# Patient Record
Sex: FEMALE | Race: WHITE | Employment: FULL TIME | URBAN - METROPOLITAN AREA
[De-identification: names, ages, dates, MRNs, and addresses within clinical notes are randomized per-mention and may not be internally consistent; named-entity substitution may affect disease eponyms.]

---

## 2017-03-06 PROBLEM — G47.33 OSA (OBSTRUCTIVE SLEEP APNEA): Status: ACTIVE | Noted: 2017-03-06

## 2017-06-09 PROCEDURE — 84590 ASSAY OF VITAMIN A: CPT | Performed by: FAMILY MEDICINE

## 2017-06-09 PROCEDURE — 82746 ASSAY OF FOLIC ACID SERUM: CPT | Performed by: FAMILY MEDICINE

## 2017-06-09 PROCEDURE — 84597 ASSAY OF VITAMIN K: CPT | Performed by: FAMILY MEDICINE

## 2017-06-09 PROCEDURE — 84207 ASSAY OF VITAMIN B-6: CPT | Performed by: FAMILY MEDICINE

## 2017-06-09 PROCEDURE — 82607 VITAMIN B-12: CPT | Performed by: FAMILY MEDICINE

## 2017-06-13 ENCOUNTER — ANESTHESIA EVENT (OUTPATIENT)
Dept: ENDOSCOPY | Facility: HOSPITAL | Age: 47
End: 2017-06-13

## 2017-06-14 ENCOUNTER — ANESTHESIA (OUTPATIENT)
Dept: ENDOSCOPY | Facility: HOSPITAL | Age: 47
End: 2017-06-14

## 2017-06-14 ENCOUNTER — SURGERY (OUTPATIENT)
Age: 47
End: 2017-06-14

## 2017-06-14 ENCOUNTER — HOSPITAL ENCOUNTER (OUTPATIENT)
Facility: HOSPITAL | Age: 47
Setting detail: HOSPITAL OUTPATIENT SURGERY
Discharge: HOME | End: 2017-06-14
Attending: INTERNAL MEDICINE | Admitting: INTERNAL MEDICINE
Payer: COMMERCIAL

## 2017-06-14 VITALS
BODY MASS INDEX: 47.09 KG/M2 | HEIGHT: 66 IN | DIASTOLIC BLOOD PRESSURE: 80 MMHG | OXYGEN SATURATION: 99 % | RESPIRATION RATE: 16 BRPM | WEIGHT: 293 LBS | HEART RATE: 80 BPM | SYSTOLIC BLOOD PRESSURE: 148 MMHG | TEMPERATURE: 98 F

## 2017-06-14 DIAGNOSIS — K50.80 CROHN'S DISEASE OF BOTH SMALL AND LARGE INTESTINE WITHOUT COMPLICATIONS (HCC): ICD-10-CM

## 2017-06-14 DIAGNOSIS — K21.9 GASTROESOPHAGEAL REFLUX DISEASE WITHOUT ESOPHAGITIS: ICD-10-CM

## 2017-06-14 PROCEDURE — 81025 URINE PREGNANCY TEST: CPT | Performed by: INTERNAL MEDICINE

## 2017-06-14 PROCEDURE — 88305 TISSUE EXAM BY PATHOLOGIST: CPT | Performed by: INTERNAL MEDICINE

## 2017-06-14 PROCEDURE — 0DB68ZX EXCISION OF STOMACH, VIA NATURAL OR ARTIFICIAL OPENING ENDOSCOPIC, DIAGNOSTIC: ICD-10-PCS | Performed by: INTERNAL MEDICINE

## 2017-06-14 PROCEDURE — 0DBE8ZX EXCISION OF LARGE INTESTINE, VIA NATURAL OR ARTIFICIAL OPENING ENDOSCOPIC, DIAGNOSTIC: ICD-10-PCS | Performed by: INTERNAL MEDICINE

## 2017-06-14 RX ORDER — HYDROMORPHONE HYDROCHLORIDE 1 MG/ML
0.4 INJECTION, SOLUTION INTRAMUSCULAR; INTRAVENOUS; SUBCUTANEOUS EVERY 5 MIN PRN
Status: DISCONTINUED | OUTPATIENT
Start: 2017-06-14 | End: 2017-06-14

## 2017-06-14 RX ORDER — DEXAMETHASONE SODIUM PHOSPHATE 4 MG/ML
4 VIAL (ML) INJECTION AS NEEDED
Status: DISCONTINUED | OUTPATIENT
Start: 2017-06-14 | End: 2017-06-14

## 2017-06-14 RX ORDER — SODIUM CHLORIDE, SODIUM LACTATE, POTASSIUM CHLORIDE, CALCIUM CHLORIDE 600; 310; 30; 20 MG/100ML; MG/100ML; MG/100ML; MG/100ML
INJECTION, SOLUTION INTRAVENOUS CONTINUOUS
Status: DISCONTINUED | OUTPATIENT
Start: 2017-06-14 | End: 2017-06-14

## 2017-06-14 RX ORDER — ONDANSETRON 2 MG/ML
4 INJECTION INTRAMUSCULAR; INTRAVENOUS AS NEEDED
Status: DISCONTINUED | OUTPATIENT
Start: 2017-06-14 | End: 2017-06-14

## 2017-06-14 RX ORDER — METOCLOPRAMIDE HYDROCHLORIDE 5 MG/ML
10 INJECTION INTRAMUSCULAR; INTRAVENOUS AS NEEDED
Status: DISCONTINUED | OUTPATIENT
Start: 2017-06-14 | End: 2017-06-14

## 2017-06-14 RX ORDER — HYDROCODONE BITARTRATE AND ACETAMINOPHEN 5; 325 MG/1; MG/1
2 TABLET ORAL AS NEEDED
Status: DISCONTINUED | OUTPATIENT
Start: 2017-06-14 | End: 2017-06-14

## 2017-06-14 RX ORDER — HYDROCODONE BITARTRATE AND ACETAMINOPHEN 5; 325 MG/1; MG/1
1 TABLET ORAL AS NEEDED
Status: DISCONTINUED | OUTPATIENT
Start: 2017-06-14 | End: 2017-06-14

## 2017-06-14 RX ORDER — NALOXONE HYDROCHLORIDE 0.4 MG/ML
80 INJECTION, SOLUTION INTRAMUSCULAR; INTRAVENOUS; SUBCUTANEOUS AS NEEDED
Status: DISCONTINUED | OUTPATIENT
Start: 2017-06-14 | End: 2017-06-14

## 2017-06-14 NOTE — ANESTHESIA PREPROCEDURE EVALUATION
PRE-OP EVALUATION    Patient Name: Naveen Cruz    Pre-op Diagnosis: Gastroesophageal reflux disease without esophagitis [K21.9]  [facility]'s [facility] of both small and large intestine without [facility] [L55.39]    Procedure(s):  COLONOSCOPY/ ES Cardiovascular      ECG reviewed.           (+) obesity  (+) hypertension                                     Endo/Other                           (+) arthritis       Pulmonary                    (+) sleep apnea       Neuro/Psych      (+)

## 2017-06-14 NOTE — H&P
The H&P dated 5/24/17 by Zack Lima MD was reviewed by Zack Lima MD today 6/14/17, the patient was examined and no significant changes have occurred in the patient's condition since the H&P was performed.   I discussed with the patient and/or legal represe

## 2017-06-14 NOTE — OPERATIVE REPORT
[facility]                                                                                           EGD/Colonoscopy Operative Report    Christin Keating Patient Status:  [facility] retroflexed to examine the angulus, GE junction, cardia, body and fundus,  then withdrawn to examine the esophagus. The endoscope was then removed from the patient. The patient tolerated the procedure well with no immediate complications.  The patient was t

## 2017-06-14 NOTE — ANESTHESIA POSTPROCEDURE EVALUATION
[address] Patient Status:  Hospital Outpatient Surgery   Age/Gender 55year old female MRN [id number]   Location [facility]. Attending Gino Christensen MD   Hosp Day # 0 PCP Shirley Vergara MD       Anesthesia Post-op Not

## 2017-06-16 NOTE — PROGRESS NOTES
Quick Note:    6/16/2017  Wilfred Davalos [id number]    Dear Lanie East,       Here are the biopsy/pathology findings from your recent EGD (upper endoscopy) and colonoscopy:     The biopsy/pathology findings from your upper end

## 2017-06-17 PROCEDURE — 84425 ASSAY OF VITAMIN B-1: CPT | Performed by: FAMILY MEDICINE

## 2017-06-22 PROCEDURE — 36415 COLL VENOUS BLD VENIPUNCTURE: CPT | Performed by: FAMILY MEDICINE

## 2017-06-22 PROCEDURE — 84425 ASSAY OF VITAMIN B-1: CPT | Performed by: FAMILY MEDICINE

## 2017-09-28 PROBLEM — R94.39 ABNORMAL NUCLEAR STRESS TEST: Status: ACTIVE | Noted: 2017-09-28

## 2018-01-19 ENCOUNTER — LAB ENCOUNTER (OUTPATIENT)
Dept: LAB | Age: 48
End: 2018-01-19
Attending: SURGERY
Payer: COMMERCIAL

## 2018-01-19 DIAGNOSIS — K90.9 INTESTINAL MALABSORPTION: Primary | ICD-10-CM

## 2018-01-19 PROCEDURE — 84134 ASSAY OF PREALBUMIN: CPT

## 2018-01-19 PROCEDURE — 85025 COMPLETE CBC W/AUTO DIFF WBC: CPT

## 2018-01-20 LAB
BASOPHILS # BLD AUTO: 0 X10(3) UL (ref 0–0.1)
BASOPHILS NFR BLD AUTO: 0 %
EOSINOPHIL # BLD AUTO: 0.12 X10(3) UL (ref 0–0.3)
EOSINOPHIL NFR BLD AUTO: 2.3 %
ERYTHROCYTE [DISTWIDTH] IN BLOOD BY AUTOMATED COUNT: 15.9 % (ref 11.5–16)
HCT VFR BLD AUTO: 43.3 % (ref 34–50)
HGB BLD-MCNC: 13.9 G/DL (ref 12–16)
LYMPHOCYTES # BLD AUTO: 2.37 X10(3) UL (ref 0.9–4)
LYMPHOCYTES NFR BLD AUTO: 45.1 %
MCH RBC QN AUTO: 28.6 PG (ref 27–33.2)
MCHC RBC AUTO-ENTMCNC: 32.1 G/DL (ref 31–37)
MCV RBC AUTO: 89.1 FL (ref 81–100)
MONOCYTES # BLD AUTO: 0.35 X10(3) UL (ref 0.1–0.6)
MONOCYTES NFR BLD AUTO: 6.7 %
NEUTROPHIL ABS PRELIM: 2.39 X10 (3) UL (ref 1.3–6.7)
NEUTROPHILS # BLD AUTO: 2.39 X10(3) UL (ref 1.3–6.7)
NEUTROPHILS NFR BLD AUTO: 45.3 %
PLATELET # BLD AUTO: 395 10(3)UL (ref 150–450)
RBC # BLD AUTO: 4.86 X10(6)UL (ref 3.8–5.1)
RED CELL DISTRIBUTION WIDTH-SD: 52.1 FL (ref 35.1–46.3)
WBC # BLD AUTO: 5.3 X10(3) UL (ref 4–13)

## 2018-01-22 LAB — PREALBUMIN: 17.7 MG/DL (ref 20–40)

## 2018-02-13 ENCOUNTER — LAB ENCOUNTER (OUTPATIENT)
Dept: LAB | Age: 48
End: 2018-02-13
Attending: SURGERY
Payer: COMMERCIAL

## 2018-02-13 DIAGNOSIS — K90.9 INTESTINAL MALABSORPTION, UNSPECIFIED TYPE: Primary | ICD-10-CM

## 2018-02-13 LAB
BASOPHILS # BLD AUTO: 0.02 X10(3) UL (ref 0–0.1)
BASOPHILS NFR BLD AUTO: 0.3 %
EOSINOPHIL # BLD AUTO: 0.09 X10(3) UL (ref 0–0.3)
EOSINOPHIL NFR BLD AUTO: 1.2 %
ERYTHROCYTE [DISTWIDTH] IN BLOOD BY AUTOMATED COUNT: 14.6 % (ref 11.5–16)
FOLATE (FOLIC ACID), SERUM: 20.3 NG/ML (ref 8.7–24)
HAV AB SERPL IA-ACNC: 1273 PG/ML (ref 193–986)
HCT VFR BLD AUTO: 43.8 % (ref 34–50)
HGB BLD-MCNC: 14 G/DL (ref 12–16)
IMMATURE GRANULOCYTE COUNT: 0.02 X10(3) UL (ref 0–1)
IMMATURE GRANULOCYTE RATIO %: 0.3 %
IRON SATURATION: 24 % (ref 13–45)
IRON: 83 UG/DL (ref 28–170)
LYMPHOCYTES # BLD AUTO: 2.95 X10(3) UL (ref 0.9–4)
LYMPHOCYTES NFR BLD AUTO: 38.6 %
MCH RBC QN AUTO: 27.9 PG (ref 27–33.2)
MCHC RBC AUTO-ENTMCNC: 32 G/DL (ref 31–37)
MCV RBC AUTO: 87.3 FL (ref 81–100)
MONOCYTES # BLD AUTO: 0.64 X10(3) UL (ref 0.1–1)
MONOCYTES NFR BLD AUTO: 8.4 %
NEUTROPHIL ABS PRELIM: 3.92 X10 (3) UL (ref 1.3–6.7)
NEUTROPHILS # BLD AUTO: 3.92 X10(3) UL (ref 1.3–6.7)
NEUTROPHILS NFR BLD AUTO: 51.2 %
PLATELET # BLD AUTO: 402 10(3)UL (ref 150–450)
PREALBUMIN: 17.3 MG/DL (ref 20–40)
RBC # BLD AUTO: 5.02 X10(6)UL (ref 3.8–5.1)
RED CELL DISTRIBUTION WIDTH-SD: 46.5 FL (ref 35.1–46.3)
TOTAL IRON BINDING CAPACITY: 341 UG/DL (ref 298–536)
TRANSFERRIN: 229 MG/DL (ref 200–360)
WBC # BLD AUTO: 7.6 X10(3) UL (ref 4–13)

## 2018-02-13 PROCEDURE — 36415 COLL VENOUS BLD VENIPUNCTURE: CPT

## 2018-02-13 PROCEDURE — 85025 COMPLETE CBC W/AUTO DIFF WBC: CPT

## 2018-02-13 PROCEDURE — 82607 VITAMIN B-12: CPT

## 2018-02-13 PROCEDURE — 83550 IRON BINDING TEST: CPT

## 2018-02-13 PROCEDURE — 83540 ASSAY OF IRON: CPT

## 2018-02-13 PROCEDURE — 84134 ASSAY OF PREALBUMIN: CPT

## 2018-02-13 PROCEDURE — 82306 VITAMIN D 25 HYDROXY: CPT

## 2018-02-13 PROCEDURE — 82746 ASSAY OF FOLIC ACID SERUM: CPT

## 2018-02-14 LAB — 25-HYDROXYVITAMIN D (TOTAL): 26.5 NG/ML (ref 30–100)

## 2018-12-11 PROBLEM — M25.511 RIGHT SHOULDER PAIN, UNSPECIFIED CHRONICITY: Status: ACTIVE | Noted: 2018-12-11

## 2018-12-11 PROBLEM — M75.102 ROTATOR CUFF SYNDROME OF BOTH SHOULDERS: Status: ACTIVE | Noted: 2018-12-11

## 2018-12-11 PROBLEM — M19.011 OSTEOARTHRITIS OF BILATERAL GLENOHUMERAL JOINTS: Status: ACTIVE | Noted: 2018-12-11

## 2018-12-11 PROBLEM — M19.012 OSTEOARTHRITIS OF BILATERAL GLENOHUMERAL JOINTS: Status: ACTIVE | Noted: 2018-12-11

## 2018-12-11 PROBLEM — M75.101 ROTATOR CUFF SYNDROME OF BOTH SHOULDERS: Status: ACTIVE | Noted: 2018-12-11

## 2018-12-30 PROCEDURE — 87086 URINE CULTURE/COLONY COUNT: CPT | Performed by: FAMILY MEDICINE

## 2019-01-10 PROBLEM — M75.21 BICEPS TENDINITIS OF RIGHT UPPER EXTREMITY: Status: ACTIVE | Noted: 2019-01-10

## 2019-01-10 PROBLEM — M75.42 IMPINGEMENT SYNDROME OF LEFT SHOULDER: Status: ACTIVE | Noted: 2019-01-10

## 2019-05-15 PROBLEM — Z98.84 S/P GASTRIC BYPASS: Status: ACTIVE | Noted: 2019-05-15

## 2020-09-29 PROBLEM — F10.20 SEVERE ALCOHOL USE DISORDER (HCC): Status: ACTIVE | Noted: 2020-09-29

## 2020-09-29 NOTE — ED INITIAL ASSESSMENT (HPI)
Patient presents requesting assistance with alcohol detox. Last drink Saturday. Patient feels shaky, skin crawling, and is not able to sleep. Denies other substances.  Denies SI.

## 2020-09-29 NOTE — ED PROVIDER NOTES
63-year-old female with a history of alcohol abuse who presented earlier requesting detox. She was medically cleared prior to my arrival.  She has been evaluated by Shirley Fuller and accepted for transfer there for detox bed.   Her  will drive her ove

## 2020-09-29 NOTE — BH LEVEL OF CARE ASSESSMENT
Level of Care Assessment Note    General Questions  Why are you here?: requesting detox from alcohol wanting to get elp for my drinking and anxiety  Precipitating Events: unemployed for 5 year  I am an  fiancial stress because of it.   History of Pr Firearm/Weapon: No  Discussion of Removal of Firearm/Weapon: no access to a gun  Do you have a firearm owner ID card?: No  Collateral for any access to means/firearms/weapons:     Self Injury  History of Self-Injurious Behaviors More than 30 Days Ag History  History of Seclusion/Restraint: No    Alcohol Use  How often do you have a drink containing alcohol? : 4 or more times per week  Alcohol Use  Age at first use?: age 25  Route: Oral  Average amount used? : daily 2 bottles of wine and either 1/5 of by a Partner/Caregiver?: No  Health Concerns r/t Abuse: No  Possible Abuse Reportable to[de-identified] Not appropriate for reporting to authorities    Patient's legal sex: female  Patient identifies as: female  Patient's birth sex: female    General Appearance  David and panic and depression since 2000. has been treated by her PCP and has been on different antidepressants medications. Patient has not worked full time in 5 years. Her current profession is a , Patient  has no putpt providers.  She only saw a therapi

## 2020-09-29 NOTE — ED PROVIDER NOTES
Patient Seen in: [facility] Emergency Department      History   Patient presents with:  Darrin-LILIA    Stated Complaint: darrin mackey. last drink sat    HPI    Patient here for help with alcoholism. She states that she is had a drinking problem for 8 months. [address]   • EXCISION OF ARM MASS Right 1/26/2011    Performed by Anh Mccurdy MD at [facility]   • OTHER      Gastric bypass   • OTHER SURGICAL HISTORY  2006 Dr Asha Lu HISTORY  2/15 Result Value    Creatinine 1.28 (*)     BUN/CREA Ratio 9.4 (*)     GFR, Non- 49 (*)     GFR, -American 57 (*)     Albumin 2.9 (*)     A/G Ratio 0.7 (*)     All other components within normal limits   ETHYL ALCOHOL - Normal   CB medications    LORAZEPAM 1 MG Oral Tab  Take 1 tablet (1 mg total) by mouth 2 (two) times daily as needed for Anxiety.   Qty: 12 tablet Refills: 0

## 2020-10-05 ENCOUNTER — PATIENT OUTREACH (OUTPATIENT)
Dept: CASE MANAGEMENT | Age: 50
End: 2020-10-05

## 2020-10-05 NOTE — PROGRESS NOTES
Name: Jennifer Perez       : 1970   Gender: female   Race: White   Ethnicity: NON  OR  OR  ETHNICITY   Employer Group:   None     Insurance Information:        Medical Group Name: [id number] [id number]   Insurance Type: Bl

## 2020-10-05 NOTE — PROGRESS NOTES
Name: Jayson Lima       : 1970   Assessment obtained via:  In Person        CASE CLOSED DATE/ REASON FOR CLOSURE:      DIAGNOSIS/ TREATMENT:    Mental Health Diagnosis:  : Alcohol Use Disorder Severe, Unspecified Anxiety, MDD Rec Mod   Medic (10 mg total) by mouth 3 (three) times daily. , Disp: 90 tablet, Rfl: 2    •  DULOXETINE HCL 30 MG Oral Cap DR Particles, TAKE ONE CAPSULE BY MOUTH EVERY DAY, Disp: 90 capsule, Rfl: 3    •  DULOXETINE HCL 60 MG Oral Cap DR Particles, TAKE 1 CAPSULE BY MOUTH will follow the program protocal. Pt states she feels great and denies any cravings. We discussed anti craving medications and I told her that they will probably discuss them with her at the PHP/IOP level . We also discussed 12 step meetings.  I encouraged

## 2024-03-08 PROBLEM — M15.1: Status: ACTIVE | Noted: 2024-01-10

## 2024-03-08 RX ORDER — THIAMINE HCL 100 MG
1 TABLET ORAL DAILY
COMMUNITY

## 2024-04-01 ENCOUNTER — LABORATORY ENCOUNTER (OUTPATIENT)
Dept: LAB | Age: 54
End: 2024-04-01
Attending: ORTHOPAEDIC SURGERY
Payer: COMMERCIAL

## 2024-04-01 ENCOUNTER — ANESTHESIA EVENT (OUTPATIENT)
Dept: SURGERY | Facility: HOSPITAL | Age: 54
End: 2024-04-01
Payer: COMMERCIAL

## 2024-04-01 DIAGNOSIS — Z01.818 PRE-OP TESTING: ICD-10-CM

## 2024-04-01 LAB
ANTIBODY SCREEN: NEGATIVE
RH BLOOD TYPE: POSITIVE

## 2024-04-01 PROCEDURE — 86901 BLOOD TYPING SEROLOGIC RH(D): CPT

## 2024-04-01 PROCEDURE — 86900 BLOOD TYPING SEROLOGIC ABO: CPT

## 2024-04-01 PROCEDURE — 86850 RBC ANTIBODY SCREEN: CPT

## 2024-04-08 NOTE — H&P
[facility]  History & Physical    Leena Hernandez Patient Status:  Outpatient in a Bed    1970 MRN [id number]   Location [facility] SURGERY Attending Keo Keen MD   Hosp Day # 0 PCP Regine Posada MD     Date of Admission:  (Not on file)    History of Present Illness:  Leena Hernandez is a(n) 53 year old female.  The patient has failed conservative treatment for left knee osteoarthritis and has significant limitations in ADL's including ambulation and exercise, and presents for total joint arthroplasty at this time.    History:  Past Medical History:   Diagnosis Date    Anxiety state     Arthritis     Asthma (HCC)     Asthma, exercise induced (HCC)     Crohn disease (HCC)     Crohn's disease (HCC)     DEPRESSION     was started by GI    Depression     Essential hypertension     High blood pressure     Hx of motion sickness     IBS (irritable bowel syndrome)     OBESITY     morbid obesity    Obesity     Osteoarthritis     OTHER DISEASES     crohn's disease    Ovarian cyst 2012    No tx done, observation only    Seizure disorder (HCC)     X1 IN MAY 2023 DUE TO ALCOHOL WITHDRAWL, NONE SINCE    SLEEP APNEA DMG SPLIT NIGHT 2--12    AHI 76/ RDI 86/ SaO2 josue 78%/ AutoPAP 6-20    Sleep apnea     NO MACHINE/TREATMENT    Visual impairment     contacts and glasses     Past Surgical History:   Procedure Laterality Date      2007    twins    CHOLECYSTECTOMY  2007    Dr Torre    COLONOSCOPY      done for Crohn's flare    COLONOSCOPY  3/13/14 - LUIS ALFREDO Horton    adenoma, chronic inactive colitis, hemorrhoids, repeat  w/MAC    COLONOSCOPY N/A 2017    Procedure: COLONOSCOPY;  Surgeon: Johny Gibbs MD;  Location:  ENDOSCOPY    COLONOSCOPY,BIOPSY  06/10/2011    LUIS ALFREDO Horton. scarring. hemorrhoids. repeat .     COLONOSCPY, FLEXIBLE, PROXIMAL TO SPLENIC FLEXURE; W/DIRECTED SUBMUCOSA INJECTION(S), ANY SUBSTANCE  06/10/2011    Performed by NATHALIE HORTON  at [facility] [place]    EXC SKIN BENIG 1.1-2CM TRUNK,ARM,LEG  01/26/2011    Performed by LESLY TORRE at [facility]    GASTRIC BYPASS,OBESITY,SB RECONSTRUC      OTHER      Gastric bypass    OTHER SURGICAL HISTORY  2006 Dr Torre    Port A Cath Placement    OTHER SURGICAL HISTORY  2/15/14 ([facility]) Dr.Matt Mack    PORT REMOVAL    UPPER GI ENDOSCOPY,BIOPSY  06/10/2011    LUIS ALFREDO Horton normal EGD     Family History   Problem Relation Age of Onset    Obesity Sister     Other (Other) Sister         hypothyroidism    Heart Disorder Mother         MI at 72    Obesity Mother     Other (Other) Mother     Other (Other) Paternal Grandfather         possible Crohn's    Heart Disorder Sister         arrhythmia    Cancer Father         prostate cx      reports that she has never smoked. She has never used smokeless tobacco. She reports that she does not currently use alcohol. She reports that she does not currently use drugs after having used the following drugs: Cannabis.    Allergies:  Allergies   Allergen Reactions    Ibuprofen NAUSEA AND VOMITING     GI UPSET    Levaquin [Levofloxacin] PAIN    Penicillins UNKNOWN     CHILDHOOD ALLERGY, REACTION UNKNOWN       Home Medications:  No medications prior to admission.       Physical Exam:   General: Alert, orientated x3.  Cooperative.  No apparent distress.  Vital Signs:  Height 67\", weight 260 lb.  HEENT: Exam is unremarkable.    Neck: No tenderness to palpitation. No JVD. Supple.   Lungs: Clear to auscultation bilaterally.  Cardiac: Regular rate and rhythm. No murmur.  Abdomen:  Soft, non-distended, non-tender, with no rebound or guarding.   Extremities:  No lower extremity edema noted.  Without clubbing or cyanosis.    The left knee is tender at the medial and lateral joint lines, with crepitus on range of motion    Laboratory Data:  xrays show severe osteoarthritis of the left knee    Impression and Plan:  Patient Active Problem List   Diagnosis    GERD  (gastroesophageal reflux disease)    OA (osteoarthritis) of knee    Crohn's disease of both small and large intestine without complication (HCC)    Morbid obesity (HCC)    History of adenomatous polyp of colon    Left shoulder pain    Rotator cuff tendinitis, left    KOLBY (generalized anxiety disorder)    Major depressive disorder, recurrent episode, moderate (HCC)    Essential hypertension    Superficial keratitis of both eyes    Floppy eyelid syndrome    SAWYER (obstructive sleep apnea)    Abnormal nuclear stress test    Rotator cuff syndrome of both shoulders    Osteoarthritis of bilateral glenohumeral joints    Right shoulder pain, unspecified chronicity    Biceps tendinitis of right upper extremity    Impingement syndrome of left shoulder    S/P gastric bypass    Severe alcohol use disorder (HCC)    Anxiety    Abdominal pain    Osteoarthritis of distal interphalangeal (DIP) joint of left ring finger       Plan is for cemented left total knee arthroplasty        Keo Keen MD  4/8/2024  12:35 PM

## 2024-04-10 ENCOUNTER — ANESTHESIA (OUTPATIENT)
Dept: SURGERY | Facility: HOSPITAL | Age: 54
End: 2024-04-10
Payer: COMMERCIAL

## 2024-04-10 ENCOUNTER — HOSPITAL ENCOUNTER (OUTPATIENT)
Facility: HOSPITAL | Age: 54
Discharge: HOME HEALTH | End: 2024-04-11
Attending: ORTHOPAEDIC SURGERY | Admitting: ORTHOPAEDIC SURGERY
Payer: COMMERCIAL

## 2024-04-10 DIAGNOSIS — Z01.818 PRE-OP TESTING: Primary | ICD-10-CM

## 2024-04-10 DIAGNOSIS — M17.12 PRIMARY OSTEOARTHRITIS OF LEFT KNEE: ICD-10-CM

## 2024-04-10 PROCEDURE — 0SRD0J9 REPLACEMENT OF LEFT KNEE JOINT WITH SYNTHETIC SUBSTITUTE, CEMENTED, OPEN APPROACH: ICD-10-PCS | Performed by: ORTHOPAEDIC SURGERY

## 2024-04-10 PROCEDURE — 88305 TISSUE EXAM BY PATHOLOGIST: CPT | Performed by: ORTHOPAEDIC SURGERY

## 2024-04-10 PROCEDURE — 97530 THERAPEUTIC ACTIVITIES: CPT

## 2024-04-10 PROCEDURE — 97161 PT EVAL LOW COMPLEX 20 MIN: CPT

## 2024-04-10 PROCEDURE — 88311 DECALCIFY TISSUE: CPT | Performed by: ORTHOPAEDIC SURGERY

## 2024-04-10 PROCEDURE — 76942 ECHO GUIDE FOR BIOPSY: CPT | Performed by: ANESTHESIOLOGY

## 2024-04-10 DEVICE — ATTUNE PATELLA MEDIALIZED DOME 38MM CEMENTED AOX
Type: IMPLANTABLE DEVICE | Site: KNEE | Status: FUNCTIONAL
Brand: ATTUNE

## 2024-04-10 DEVICE — ATTUNE KNEE SYSTEM FEMORAL POSTERIOR STABILIZED SIZE 7 LEFT CEMENTED
Type: IMPLANTABLE DEVICE | Site: KNEE | Status: FUNCTIONAL
Brand: ATTUNE

## 2024-04-10 DEVICE — SMARTSET HV HIGH VISCOSITY BONE CEMENT 40G
Type: IMPLANTABLE DEVICE | Site: KNEE | Status: FUNCTIONAL
Brand: SMARTSET

## 2024-04-10 DEVICE — ATTUNE KNEE SYSTEM TIBIAL BASE ROTATING PLATFORM SIZE 6 CEMENTED
Type: IMPLANTABLE DEVICE | Site: KNEE | Status: FUNCTIONAL
Brand: ATTUNE

## 2024-04-10 DEVICE — ATTUNE KNEE SYSTEM TIBIAL INSERT ROTATING PLATFORM POSTERIOR STABILIZED 7 10MM AOX
Type: IMPLANTABLE DEVICE | Site: KNEE | Status: FUNCTIONAL
Brand: ATTUNE

## 2024-04-10 RX ORDER — DEXAMETHASONE SODIUM PHOSPHATE 10 MG/ML
8 INJECTION, SOLUTION INTRAMUSCULAR; INTRAVENOUS ONCE
Status: COMPLETED | OUTPATIENT
Start: 2024-04-11 | End: 2024-04-11

## 2024-04-10 RX ORDER — TRANEXAMIC ACID 10 MG/ML
INJECTION, SOLUTION INTRAVENOUS AS NEEDED
Status: DISCONTINUED | OUTPATIENT
Start: 2024-04-10 | End: 2024-04-10 | Stop reason: SURG

## 2024-04-10 RX ORDER — SCOLOPAMINE TRANSDERMAL SYSTEM 1 MG/1
1 PATCH, EXTENDED RELEASE TRANSDERMAL ONCE
Status: DISCONTINUED | OUTPATIENT
Start: 2024-04-10 | End: 2024-04-10 | Stop reason: HOSPADM

## 2024-04-10 RX ORDER — MIDAZOLAM HYDROCHLORIDE 1 MG/ML
INJECTION INTRAMUSCULAR; INTRAVENOUS AS NEEDED
Status: DISCONTINUED | OUTPATIENT
Start: 2024-04-10 | End: 2024-04-10 | Stop reason: SURG

## 2024-04-10 RX ORDER — PHENYLEPHRINE HCL 10 MG/ML
VIAL (ML) INJECTION AS NEEDED
Status: DISCONTINUED | OUTPATIENT
Start: 2024-04-10 | End: 2024-04-10 | Stop reason: SURG

## 2024-04-10 RX ORDER — ASPIRIN 81 MG/1
81 TABLET ORAL 2 TIMES DAILY
Qty: 28 TABLET | Refills: 0 | Status: SHIPPED | OUTPATIENT
Start: 2024-04-10 | End: 2024-04-11

## 2024-04-10 RX ORDER — LOSARTAN POTASSIUM 100 MG/1
100 TABLET ORAL DAILY
COMMUNITY

## 2024-04-10 RX ORDER — HYDROCODONE BITARTRATE AND ACETAMINOPHEN 5; 325 MG/1; MG/1
2 TABLET ORAL ONCE AS NEEDED
Status: DISCONTINUED | OUTPATIENT
Start: 2024-04-10 | End: 2024-04-10 | Stop reason: HOSPADM

## 2024-04-10 RX ORDER — FOLIC ACID 1 MG/1
1 TABLET ORAL DAILY
Status: DISCONTINUED | OUTPATIENT
Start: 2024-04-11 | End: 2024-04-11

## 2024-04-10 RX ORDER — ROPIVACAINE HYDROCHLORIDE 5 MG/ML
INJECTION, SOLUTION EPIDURAL; INFILTRATION; PERINEURAL AS NEEDED
Status: DISCONTINUED | OUTPATIENT
Start: 2024-04-10 | End: 2024-04-10 | Stop reason: SURG

## 2024-04-10 RX ORDER — MELATONIN
325
Status: DISCONTINUED | OUTPATIENT
Start: 2024-04-11 | End: 2024-04-11

## 2024-04-10 RX ORDER — TRANEXAMIC ACID 10 MG/ML
INJECTION, SOLUTION INTRAVENOUS
Status: COMPLETED
Start: 2024-04-10 | End: 2024-04-10

## 2024-04-10 RX ORDER — ENEMA 19; 7 G/133ML; G/133ML
1 ENEMA RECTAL ONCE AS NEEDED
Status: DISCONTINUED | OUTPATIENT
Start: 2024-04-10 | End: 2024-04-11

## 2024-04-10 RX ORDER — HYDROMORPHONE HYDROCHLORIDE 1 MG/ML
0.4 INJECTION, SOLUTION INTRAMUSCULAR; INTRAVENOUS; SUBCUTANEOUS EVERY 5 MIN PRN
Status: DISCONTINUED | OUTPATIENT
Start: 2024-04-10 | End: 2024-04-10 | Stop reason: HOSPADM

## 2024-04-10 RX ORDER — ACETAMINOPHEN 325 MG/1
650 TABLET ORAL ONCE
Status: COMPLETED | OUTPATIENT
Start: 2024-04-10 | End: 2024-04-10

## 2024-04-10 RX ORDER — ONDANSETRON 2 MG/ML
INJECTION INTRAMUSCULAR; INTRAVENOUS AS NEEDED
Status: DISCONTINUED | OUTPATIENT
Start: 2024-04-10 | End: 2024-04-10 | Stop reason: SURG

## 2024-04-10 RX ORDER — ASPIRIN 81 MG/1
81 TABLET ORAL 2 TIMES DAILY
Status: DISCONTINUED | OUTPATIENT
Start: 2024-04-10 | End: 2024-04-10

## 2024-04-10 RX ORDER — DEXAMETHASONE SODIUM PHOSPHATE 10 MG/ML
INJECTION, SOLUTION INTRAMUSCULAR; INTRAVENOUS AS NEEDED
Status: DISCONTINUED | OUTPATIENT
Start: 2024-04-10 | End: 2024-04-10 | Stop reason: SURG

## 2024-04-10 RX ORDER — ACETAMINOPHEN 500 MG
1000 TABLET ORAL ONCE
Status: DISCONTINUED | OUTPATIENT
Start: 2024-04-10 | End: 2024-04-10 | Stop reason: HOSPADM

## 2024-04-10 RX ORDER — BUPIVACAINE HYDROCHLORIDE 7.5 MG/ML
INJECTION, SOLUTION INTRASPINAL AS NEEDED
Status: DISCONTINUED | OUTPATIENT
Start: 2024-04-10 | End: 2024-04-10 | Stop reason: SURG

## 2024-04-10 RX ORDER — CEFAZOLIN SODIUM IN 0.9 % NACL 3 G/100 ML
3 INTRAVENOUS SOLUTION, PIGGYBACK (ML) INTRAVENOUS EVERY 8 HOURS
Qty: 200 ML | Refills: 0 | Status: COMPLETED | OUTPATIENT
Start: 2024-04-10 | End: 2024-04-11

## 2024-04-10 RX ORDER — KETOROLAC TROMETHAMINE 30 MG/ML
30 INJECTION, SOLUTION INTRAMUSCULAR; INTRAVENOUS EVERY 6 HOURS
Status: DISCONTINUED | OUTPATIENT
Start: 2024-04-10 | End: 2024-04-11

## 2024-04-10 RX ORDER — OXYCODONE HYDROCHLORIDE 5 MG/1
5 TABLET ORAL EVERY 4 HOURS PRN
Status: DISCONTINUED | OUTPATIENT
Start: 2024-04-10 | End: 2024-04-11

## 2024-04-10 RX ORDER — TRANEXAMIC ACID 10 MG/ML
1000 INJECTION, SOLUTION INTRAVENOUS ONCE
Status: COMPLETED | OUTPATIENT
Start: 2024-04-10 | End: 2024-04-10

## 2024-04-10 RX ORDER — ACETAMINOPHEN 500 MG
1000 TABLET ORAL EVERY 8 HOURS PRN
Status: DISCONTINUED | OUTPATIENT
Start: 2024-04-10 | End: 2024-04-11

## 2024-04-10 RX ORDER — EPHEDRINE SULFATE 50 MG/ML
INJECTION INTRAVENOUS AS NEEDED
Status: DISCONTINUED | OUTPATIENT
Start: 2024-04-10 | End: 2024-04-10 | Stop reason: SURG

## 2024-04-10 RX ORDER — HYDROCODONE BITARTRATE AND ACETAMINOPHEN 5; 325 MG/1; MG/1
1 TABLET ORAL ONCE AS NEEDED
Status: DISCONTINUED | OUTPATIENT
Start: 2024-04-10 | End: 2024-04-10 | Stop reason: HOSPADM

## 2024-04-10 RX ORDER — LABETALOL HYDROCHLORIDE 5 MG/ML
5 INJECTION, SOLUTION INTRAVENOUS EVERY 5 MIN PRN
Status: DISCONTINUED | OUTPATIENT
Start: 2024-04-10 | End: 2024-04-10 | Stop reason: HOSPADM

## 2024-04-10 RX ORDER — BISACODYL 10 MG
10 SUPPOSITORY, RECTAL RECTAL
Status: DISCONTINUED | OUTPATIENT
Start: 2024-04-10 | End: 2024-04-11

## 2024-04-10 RX ORDER — HYDROMORPHONE HYDROCHLORIDE 1 MG/ML
0.4 INJECTION, SOLUTION INTRAMUSCULAR; INTRAVENOUS; SUBCUTANEOUS EVERY 2 HOUR PRN
Status: DISCONTINUED | OUTPATIENT
Start: 2024-04-10 | End: 2024-04-11

## 2024-04-10 RX ORDER — OXYCODONE HYDROCHLORIDE 10 MG/1
10 TABLET ORAL EVERY 4 HOURS PRN
Status: DISCONTINUED | OUTPATIENT
Start: 2024-04-10 | End: 2024-04-11

## 2024-04-10 RX ORDER — DOCUSATE SODIUM 100 MG/1
100 CAPSULE, LIQUID FILLED ORAL 2 TIMES DAILY
Status: DISCONTINUED | OUTPATIENT
Start: 2024-04-10 | End: 2024-04-11

## 2024-04-10 RX ORDER — ONDANSETRON 2 MG/ML
4 INJECTION INTRAMUSCULAR; INTRAVENOUS EVERY 6 HOURS PRN
Status: DISCONTINUED | OUTPATIENT
Start: 2024-04-10 | End: 2024-04-11

## 2024-04-10 RX ORDER — DIPHENHYDRAMINE HYDROCHLORIDE 50 MG/ML
25 INJECTION INTRAMUSCULAR; INTRAVENOUS ONCE AS NEEDED
Status: ACTIVE | OUTPATIENT
Start: 2024-04-10 | End: 2024-04-10

## 2024-04-10 RX ORDER — ACETAMINOPHEN 500 MG
1000 TABLET ORAL ONCE AS NEEDED
Status: DISCONTINUED | OUTPATIENT
Start: 2024-04-10 | End: 2024-04-10 | Stop reason: HOSPADM

## 2024-04-10 RX ORDER — HYDROCODONE BITARTRATE AND ACETAMINOPHEN 10; 325 MG/1; MG/1
1-2 TABLET ORAL EVERY 4 HOURS PRN
Qty: 60 TABLET | Refills: 0 | Status: SHIPPED | OUTPATIENT
Start: 2024-04-10

## 2024-04-10 RX ORDER — TIZANIDINE 2 MG/1
2 TABLET ORAL EVERY 6 HOURS PRN
Status: DISCONTINUED | OUTPATIENT
Start: 2024-04-10 | End: 2024-04-11

## 2024-04-10 RX ORDER — DIPHENHYDRAMINE HCL 25 MG
25 CAPSULE ORAL EVERY 4 HOURS PRN
Status: DISCONTINUED | OUTPATIENT
Start: 2024-04-10 | End: 2024-04-11

## 2024-04-10 RX ORDER — TRANEXAMIC ACID 10 MG/ML
1000 INJECTION, SOLUTION INTRAVENOUS ONCE
Status: DISCONTINUED | OUTPATIENT
Start: 2024-04-10 | End: 2024-04-10 | Stop reason: HOSPADM

## 2024-04-10 RX ORDER — HYDROMORPHONE HYDROCHLORIDE 1 MG/ML
0.2 INJECTION, SOLUTION INTRAMUSCULAR; INTRAVENOUS; SUBCUTANEOUS EVERY 5 MIN PRN
Status: DISCONTINUED | OUTPATIENT
Start: 2024-04-10 | End: 2024-04-10 | Stop reason: HOSPADM

## 2024-04-10 RX ORDER — NALTREXONE HYDROCHLORIDE 50 MG/1
50 TABLET, FILM COATED ORAL DAILY
Status: DISCONTINUED | OUTPATIENT
Start: 2024-04-11 | End: 2024-04-11

## 2024-04-10 RX ORDER — SODIUM CHLORIDE, SODIUM LACTATE, POTASSIUM CHLORIDE, CALCIUM CHLORIDE 600; 310; 30; 20 MG/100ML; MG/100ML; MG/100ML; MG/100ML
INJECTION, SOLUTION INTRAVENOUS CONTINUOUS
Status: DISCONTINUED | OUTPATIENT
Start: 2024-04-10 | End: 2024-04-10 | Stop reason: HOSPADM

## 2024-04-10 RX ORDER — ACETAMINOPHEN 325 MG/1
TABLET ORAL
Status: COMPLETED
Start: 2024-04-10 | End: 2024-04-10

## 2024-04-10 RX ORDER — DIPHENHYDRAMINE HYDROCHLORIDE 50 MG/ML
12.5 INJECTION INTRAMUSCULAR; INTRAVENOUS EVERY 4 HOURS PRN
Status: DISCONTINUED | OUTPATIENT
Start: 2024-04-10 | End: 2024-04-11

## 2024-04-10 RX ORDER — MEPERIDINE HYDROCHLORIDE 25 MG/ML
12.5 INJECTION INTRAMUSCULAR; INTRAVENOUS; SUBCUTANEOUS AS NEEDED
Status: DISCONTINUED | OUTPATIENT
Start: 2024-04-10 | End: 2024-04-10 | Stop reason: HOSPADM

## 2024-04-10 RX ORDER — ONDANSETRON 2 MG/ML
4 INJECTION INTRAMUSCULAR; INTRAVENOUS EVERY 6 HOURS PRN
Status: DISCONTINUED | OUTPATIENT
Start: 2024-04-10 | End: 2024-04-10 | Stop reason: HOSPADM

## 2024-04-10 RX ORDER — POLYETHYLENE GLYCOL 3350 17 G/17G
17 POWDER, FOR SOLUTION ORAL DAILY PRN
Status: DISCONTINUED | OUTPATIENT
Start: 2024-04-10 | End: 2024-04-11

## 2024-04-10 RX ORDER — SENNOSIDES 8.6 MG
17.2 TABLET ORAL NIGHTLY
Status: DISCONTINUED | OUTPATIENT
Start: 2024-04-10 | End: 2024-04-11

## 2024-04-10 RX ORDER — HYDROXYZINE HYDROCHLORIDE 25 MG/1
50 TABLET, FILM COATED ORAL 4 TIMES DAILY PRN
Status: DISCONTINUED | OUTPATIENT
Start: 2024-04-10 | End: 2024-04-11

## 2024-04-10 RX ORDER — AMLODIPINE BESYLATE 2.5 MG/1
2.5 TABLET ORAL DAILY
COMMUNITY
Start: 2024-03-24

## 2024-04-10 RX ORDER — HYDROMORPHONE HYDROCHLORIDE 1 MG/ML
0.6 INJECTION, SOLUTION INTRAMUSCULAR; INTRAVENOUS; SUBCUTANEOUS EVERY 5 MIN PRN
Status: DISCONTINUED | OUTPATIENT
Start: 2024-04-10 | End: 2024-04-10 | Stop reason: HOSPADM

## 2024-04-10 RX ORDER — PROCHLORPERAZINE EDISYLATE 5 MG/ML
5 INJECTION INTRAMUSCULAR; INTRAVENOUS EVERY 8 HOURS PRN
Status: DISCONTINUED | OUTPATIENT
Start: 2024-04-10 | End: 2024-04-11

## 2024-04-10 RX ORDER — NALOXONE HYDROCHLORIDE 0.4 MG/ML
80 INJECTION, SOLUTION INTRAMUSCULAR; INTRAVENOUS; SUBCUTANEOUS AS NEEDED
Status: DISCONTINUED | OUTPATIENT
Start: 2024-04-10 | End: 2024-04-10 | Stop reason: HOSPADM

## 2024-04-10 RX ORDER — HYDROMORPHONE HYDROCHLORIDE 1 MG/ML
0.8 INJECTION, SOLUTION INTRAMUSCULAR; INTRAVENOUS; SUBCUTANEOUS EVERY 2 HOUR PRN
Status: DISCONTINUED | OUTPATIENT
Start: 2024-04-10 | End: 2024-04-11

## 2024-04-10 RX ORDER — SODIUM CHLORIDE, SODIUM LACTATE, POTASSIUM CHLORIDE, CALCIUM CHLORIDE 600; 310; 30; 20 MG/100ML; MG/100ML; MG/100ML; MG/100ML
INJECTION, SOLUTION INTRAVENOUS CONTINUOUS
Status: DISCONTINUED | OUTPATIENT
Start: 2024-04-10 | End: 2024-04-11

## 2024-04-10 RX ORDER — DEXAMETHASONE SODIUM PHOSPHATE 4 MG/ML
VIAL (ML) INJECTION AS NEEDED
Status: DISCONTINUED | OUTPATIENT
Start: 2024-04-10 | End: 2024-04-10 | Stop reason: SURG

## 2024-04-10 RX ORDER — CEFAZOLIN SODIUM/WATER 2 G/20 ML
2 SYRINGE (ML) INTRAVENOUS ONCE
Status: COMPLETED | OUTPATIENT
Start: 2024-04-10 | End: 2024-04-10

## 2024-04-10 RX ORDER — PROCHLORPERAZINE EDISYLATE 5 MG/ML
5 INJECTION INTRAMUSCULAR; INTRAVENOUS EVERY 8 HOURS PRN
Status: DISCONTINUED | OUTPATIENT
Start: 2024-04-10 | End: 2024-04-10 | Stop reason: HOSPADM

## 2024-04-10 RX ADMIN — ROPIVACAINE HYDROCHLORIDE 20 ML: 5 INJECTION, SOLUTION EPIDURAL; INFILTRATION; PERINEURAL at 11:30:00

## 2024-04-10 RX ADMIN — SODIUM CHLORIDE, SODIUM LACTATE, POTASSIUM CHLORIDE, CALCIUM CHLORIDE: 600; 310; 30; 20 INJECTION, SOLUTION INTRAVENOUS at 12:01:00

## 2024-04-10 RX ADMIN — BUPIVACAINE HYDROCHLORIDE 1.6 ML: 7.5 INJECTION, SOLUTION INTRASPINAL at 11:25:00

## 2024-04-10 RX ADMIN — EPHEDRINE SULFATE 15 MG: 50 INJECTION INTRAVENOUS at 12:03:00

## 2024-04-10 RX ADMIN — PHENYLEPHRINE HCL 100 MCG: 10 MG/ML VIAL (ML) INJECTION at 11:51:00

## 2024-04-10 RX ADMIN — PHENYLEPHRINE HCL 100 MCG: 10 MG/ML VIAL (ML) INJECTION at 11:45:00

## 2024-04-10 RX ADMIN — PHENYLEPHRINE HCL 100 MCG: 10 MG/ML VIAL (ML) INJECTION at 11:56:00

## 2024-04-10 RX ADMIN — DEXAMETHASONE SODIUM PHOSPHATE 4 MG: 10 INJECTION, SOLUTION INTRAMUSCULAR; INTRAVENOUS at 11:30:00

## 2024-04-10 RX ADMIN — TRANEXAMIC ACID 1000 MG: 10 INJECTION, SOLUTION INTRAVENOUS at 11:32:00

## 2024-04-10 RX ADMIN — DEXAMETHASONE SODIUM PHOSPHATE 4 MG: 4 MG/ML VIAL (ML) INJECTION at 11:11:00

## 2024-04-10 RX ADMIN — EPHEDRINE SULFATE 10 MG: 50 INJECTION INTRAVENOUS at 12:17:00

## 2024-04-10 RX ADMIN — CEFAZOLIN SODIUM/WATER 2 G: 2 G/20 ML SYRINGE (ML) INTRAVENOUS at 11:11:00

## 2024-04-10 RX ADMIN — ONDANSETRON 4 MG: 2 INJECTION INTRAMUSCULAR; INTRAVENOUS at 11:11:00

## 2024-04-10 RX ADMIN — SODIUM CHLORIDE, SODIUM LACTATE, POTASSIUM CHLORIDE, CALCIUM CHLORIDE: 600; 310; 30; 20 INJECTION, SOLUTION INTRAVENOUS at 11:08:00

## 2024-04-10 RX ADMIN — MIDAZOLAM HYDROCHLORIDE 2 MG: 1 INJECTION INTRAMUSCULAR; INTRAVENOUS at 11:11:00

## 2024-04-10 NOTE — PLAN OF CARE
Post-op day 0. Dressing clean, dry, and intact at time of admission, but became saturated upon standing. Dressing change completed with notification to surgeon. Patient is alert and oriented x4. Room air. Bilateral SCD's and Compression sleeve on surgical leg. Education provided on incentive spirometer. SAWYER with no CPAP. Continuous pulse oximeter. Telemetry monitoring. Last bowel movement 4/10. Physical and occupational therapy evaluations pending. Weightbearing as tolerated with knee immobilizer in place. Regular diet. Plan is home with [facility] when medically stable.

## 2024-04-10 NOTE — PROGRESS NOTES
Post op meds sent electronically to [facility].   Patient will take Xarelto instead of ASA.

## 2024-04-10 NOTE — CM/SW NOTE
Department  notified of request for HHC, aidin referrals started. Assigned CM/SW to follow up with pt/family on further discharge planning.     Elizabeth Mcpherson, DSC

## 2024-04-10 NOTE — ANESTHESIA POSTPROCEDURE EVALUATION
[facility]    Leena Hernandez Patient Status:  Outpatient in a Bed   Age/Gender 53 year old female MRN [id number]   Location [facility] SURGERY Attending Keo Keen MD   Hosp Day # 0 PCP Regine Posada MD       Anesthesia Post-op Note    LEFT TOTAL KNEE ARTHROPLASTY    Procedure Summary       Date: 04/10/24 Room / Location:  MAIN OR 14 / [facility] MAIN OR    Anesthesia Start: 1107 Anesthesia Stop: 1227    Procedure: LEFT TOTAL KNEE ARTHROPLASTY (Left: Knee) Diagnosis: (PRIMARY OSTEOARTHRITIS OF LEFT KNEE)    Surgeons: Keo Keen MD Anesthesiologist: Jeancarlos Park MD    Anesthesia Type: spinal ASA Status: 3            Anesthesia Type: spinal    Vitals Value Taken Time   /58 04/10/24 1228   Temp 97.4 04/10/24 1228   Pulse 97 04/10/24 1228   Resp 18 04/10/24 1228   SpO2 100 04/10/24 1228       Patient Location: PACU    Anesthesia Type: spinal    Airway Patency: patent    Postop Pain Control: adequate    Mental Status: preanesthetic baseline    Nausea/Vomiting: none    Cardiopulmonary/Hydration status: stable euvolemic    Complications: no apparent anesthesia related complications    Postop vital signs: stable    Dental Exam: Unchanged from Preop    Patient to be discharged from PACU when criteria met.

## 2024-04-10 NOTE — PHYSICAL THERAPY NOTE
PHYSICAL THERAPY JOINT EVALUATION - INPATIENT     Room Number: 360/360-A  Evaluation Date: 4/10/2024  Type of Evaluation: Initial  Physician Order: PT Eval and Treat    Presenting Problem: s/p L TKA on 4/10/24  Co-Morbidities : anxiety, asthma, depression, Crohn's disease, seizure disorder, OA, HTN  Reason for Therapy: Mobility Dysfunction and Discharge Planning    PHYSICAL THERAPY ASSESSMENT   Patient is currently functioning below baseline with bed mobility, transfers, gait, and stair negotiation. Prior to admission, patient's baseline is independent with no AD.   Patient is requiring minimal assist as a result of the following impairments: decreased functional strength, pain, impaired standing balance, and limited   ROM.  Physical Therapy will continue to follow for duration of hospitalization.    Patient will benefit from continued skilled PT Services at discharge to promote functional independence in home.  Anticipate patient will return home with home health PT.    PLAN  PT Treatment Plan: Bed mobility;Endurance;Energy conservation;Patient education;Family education;Gait training;Strengthening;Stoop training;Stair training;Transfer training;Balance training;Range of motion  Rehab Potential : Good  Frequency (Obs): Daily  Number of Visits to Meet Established Goals: 2    CURRENT GOALS  Goal #1  Patient is able to demonstrate supine - sit EOB @ level: supervision     Goal #2  Patient is able to demonstrate transfers Sit to/from Stand at assistance level: supervision       Goal #3    Patient is able to ambulate 150 feet with assistive device at assistance level: supervision   Goal #4    Patient will negotiate 4 stairs/one curb w/ assistive device and supervision   Goal #5    Patient verbalizes and/or demonstrates all precautions and safety concerns independently    Goal #6      Goal Comments: Goals established on 4/10/2024      PHYSICAL THERAPY MEDICAL/SOCIAL HISTORY  History related to current admission:  Patient is a 53 year old female admitted on 4/10/2024 from home for L TKA.      HOME SITUATION  Type of Home: House   Home Layout: One level  Stairs to Enter : 4  Railing: Yes  Stairs to Bedroom: 0       Lives With: Spouse     Patient Owned Equipment: Rolling walker;Cane       Prior Level of [place]: Pt is typically independent with ADLs and ambulates with no AD. Pt with supportive spouse that can assist as needed.     SUBJECTIVE  Pt pleasant and cooperative during session      OBJECTIVE  Precautions: Knee immobilizer;Bed/chair alarm;Seizure  Fall Risk: High fall risk    WEIGHT BEARING RESTRICTION  Weight Bearing Restriction: L lower extremity           L Lower Extremity: Weight Bearing as Tolerated    PAIN ASSESSMENT  Rating: Unable to rate  Location: L knee  Management Techniques: Activity promotion;Relaxation;Repositioning    COGNITION  Overall Cognitive Status:  WFL - within functional limits    RANGE OF MOTION AND STRENGTH ASSESSMENT  Upper extremity ROM and strength are within functional limits     Lower extremity ROM is within functional limits, except LLE s/p TKA    Lower extremity strength is within functional limits, except LLE s/p TKA      BALANCE  Static Sitting: Good  Dynamic Sitting: Good  Static Standing: Poor +  Dynamic Standing: Poor +    ADDITIONAL TESTS                                    ACTIVITY TOLERANCE   Vitals stable, denies dizziness                      O2 WALK       NEUROLOGICAL FINDINGS                        AM-PAC '6-Clicks' INPATIENT SHORT FORM - BASIC MOBILITY  How much difficulty does the patient currently have...  Patient Difficulty: Turning over in bed (including adjusting bedclothes, sheets and blankets)?: A Little   Patient Difficulty: Sitting down on and standing up from a chair with arms (e.g., wheelchair, bedside commode, etc.): A Little   Patient Difficulty: Moving from lying on back to sitting on the side of the bed?: A Little   How much help from another person does  the patient currently need...   Help from Another: Moving to and from a bed to a chair (including a wheelchair)?: A Little   Help from Another: Need to walk in hospital room?: A Little   Help from Another: Climbing 3-5 steps with a railing?: A Little       AM-PAC Score:  Raw Score: 18   Approx Degree of Impairment: 46.58%   Standardized Score (AM-PAC Scale): 43.63   CMS Modifier (G-Code): CK    FUNCTIONAL ABILITY STATUS  Gait Assessment   Functional Mobility/Gait Assessment  Gait Assistance: Minimum assistance  Distance (ft):  (several steps bed to chair)  Assistive Device: Rolling walker  Pattern: L Decreased stance time    Skilled Therapy Provided: Per RN okay to work with pt. Pt received in supine and was agreeable to PT session.     Pt noted to have blood saturating L knee bandage, RN notified.     Bed Mobility:  Rolling: NT  Supine to sit: supervision, HOB elevated    Sit to supine: NT     Transfer Mobility:  Sit to stand: min A    Stand to sit: min A   Gait = pt took several steps to turn and sit in bedside chair positioned on the R with RW and min A     When pt stood the first time, she urinated on the floor, reports decreased sensation in neymar-area and also in LLE. Pt assisted in donning a clean gown, socks, and brief. No obvious L knee buckling when transferring to the chair, but recommend KI at this time for increased safety and fall prevention due to reports of decreased sensation and pt reports \"feels wobbly\".     Therapist's Comments: Pt educated on role of therapy, goals for session, safety, fall prevention, WBAT, and activity recommendations.     Exercise/Education Provided:  Bed mobility  Energy conservation  Functional activity tolerated  Posture  Strengthening  Transfer training    Patient End of Session: Up in chair;Needs met;Call light within reach;RN aware of session/findings;All patient questions and concerns addressed;Alarm set;Family present    Patient Evaluation Complexity Level:  History  Moderate - 1 or 2 personal factors and/or co-morbidities   Examination of body systems Low - addressing 1-2 elements   Clinical Presentation Low - Stable   Clinical Decision Making Low Complexity       PT Session Time: 30 minutes  Therapeutic Activity: 10 minutes

## 2024-04-10 NOTE — INTERVAL H&P NOTE
Pre-op Diagnosis: PRIMARY OSTEOARTHRITIS OF LEFT KNEE    The above referenced H&P was reviewed by Keo Keen MD on 4/10/2024, the patient was examined and no significant changes have occurred in the patient's condition since the H&P was performed.  I discussed with the patient and/or legal representative the potential benefits, risks and side effects of this procedure; the likelihood of the patient achieving goals; and potential problems that might occur during recuperation.  I discussed reasonable alternatives to the procedure, including risks, benefits and side effects related to the alternatives and risks related to not receiving this procedure.  We will proceed with procedure as planned.

## 2024-04-10 NOTE — ANESTHESIA PROCEDURE NOTES
Regional Block    Date/Time: 4/10/2024 11:27 AM    Performed by: Jeancarlos Park MD  Authorized by: Jeancarlos Park MD      General Information and Staff    Start Time:  4/10/2024 11:27 AM  End Time:  4/10/2024 11:30 AM  Anesthesiologist:  Jeancarlos Park MD  Performed by:  Anesthesiologist  Patient Location:  OR    Block Placement: Pre Induction  Site Identification: real time ultrasound guided and image stored and retrievable    Block site/laterality marked before start: site marked  Reason for Block: at surgeon's request and post-op pain management    Preanesthetic Checklist: 2 patient identifers, IV checked, risks and benefits discussed, monitors and equipment checked, pre-op evaluation, timeout performed, anesthesia consent, sterile technique used, no prohibitive neurological deficits and no local skin infection at insertion site      Procedure Details    Patient Position:  Supine  Prep: ChloraPrep    Monitoring:  Cardiac monitor, continuous pulse ox and blood pressure cuff  Block Type:  Adductor canal  Laterality:  Left  Injection Technique:  Single-shot    Needle    Needle Type:  Short-bevel and echogenic  Needle Gauge:  21 G  Needle Length:  100 mm  Needle Localization:  Ultrasound guidance  Reason for Ultrasound Use: appropriate spread of the medication was noted in real time and no ultrasound evidence of intravascular and/or intraneural injection            Assessment    Injection Assessment:  Good spread noted, negative resistance, negative aspiration for heme, incremental injection and low pressure  Heart Rate Change: No    - Patient tolerated block procedure well without evidence of immediate block related complications.     Medications  4/10/2024 11:27 AM      Additional Comments    Medication:  Ropivacaine 0.5% 20mL with 1:200,000 epi and dexamethasone 4mg PF.

## 2024-04-10 NOTE — ANESTHESIA PROCEDURE NOTES
Spinal Block    Date/Time: 4/10/2024 11:12 AM    Performed by: Jeancarlos Park MD  Authorized by: Jeancarlos Park MD      General Information and Staff    Start Time:  4/10/2024 11:12 AM  End Time:  4/10/2024 11:25 AM  Anesthesiologist:  Jeancarlos Park MD  Performed by:  Anesthesiologist  Site identification: surface landmarks    Preanesthetic Checklist: patient identified, IV checked, risks and benefits discussed, monitors and equipment checked, pre-op evaluation, timeout performed, anesthesia consent and sterile technique used      Procedure Details    Patient Position:  Sitting  Prep: ChloraPrep    Monitoring:  Cardiac monitor, heart rate and continuous pulse ox  Approach:  Midline  Location:  L3-4  Injection Technique:  Single-shot    Needle    Needle Type:  Pencil-tip  Needle Gauge:  22 G  Needle Length:  3.5 in    Assessment    Sensory Level:   Events: clear CSF, CSF aspirated, well tolerated and blood negative      Additional Comments     Patient in sitting position with ASA monitors on.  Lumbar area prepped and draped in sterile fashion.  Lido 1% skin infiltration at L4-L5 and L3-L4 interspace.  21G 30mm introducer needle placed midline at L4-L5 interspace and 24G Sprotte 3.5\" spinal needle advanced through introducer; unable to reach intrathecal space at L4-L5.  Next attempt at L3-L4 also unsuccessful.  Jacque 22G 3.5\" spinal needle advanced midline at L3-L4 interspace and successfully reached intrathecal space with clear +CSF free flow.  After clear CSF aspirate, marcaine 0.75% with dextrose 8.25% 1.6 ml given intrathecally.  No heme or paresthesias noted.  Patient tolerated procedure well without any apparent complications.

## 2024-04-10 NOTE — CONSULTS
[facility]   part of [facility] Hospitalist Consult Note     Leena Hernandez Patient Status:  Outpatient in a Bed    1970 MRN [id number]   Location [facility] 3SW-A Attending Keo Keen MD   Hosp Day # 0 PCP Regine Posada MD     Consult: Postoperative medical comanagement s/p left TKA    Consulting Provider: Keo Keen MD    History of Present Illness: Leena Hernandez is a 53 year old female with asthma, HTN, left knee OA who presents following left cemented total knee arthroplasty earlier today.  The procedure was well-tolerated with approximately  150 mL EBL and no complications noted.  Postoperatively she remained afebrile, hemodynamically stable and saturating well on room air.    Following arrival to the floor, she continues to endorse some paresthesias of the leg but otherwise has no new or worsening symptoms.  Her pain is well-controlled at this time.    Past Medical History:  Past Medical History:    Anxiety state    Arthritis    Asthma, exercise induced (HCC)    Crohn disease (HCC)    Crohn's disease (HCC)    DEPRESSION    was started by GI    Depression    Essential hypertension    High blood pressure    Hx of motion sickness    IBS (irritable bowel syndrome)    OBESITY    morbid obesity    Obesity    Osteoarthritis    OTHER DISEASES    crohn's disease    Ovarian cyst    No tx done, observation only    Seizure disorder (HCC)    X1 IN MAY 2023 DUE TO ALCOHOL WITHDRAWL, NONE SINCE    SLEEP APNEA    AHI 76/ RDI 86/ SaO2 josue 78%/ AutoPAP 6-20    Sleep apnea    NO MACHINE/TREATMENT    Visual impairment    contacts and glasses        Past Surgical History:   Past Surgical History:   Procedure Laterality Date      2007    twins    Cholecystectomy  2007    Dr Torre    Colonoscopy  2006    done for Crohn's flare    Colonoscopy  3/13/14 - LUIS ALFREDO Horton    adenoma, chronic inactive colitis, hemorrhoids, repeat  w/MAC     Colonoscopy N/A 06/14/2017    Procedure: COLONOSCOPY;  Surgeon: Johny Gibbs MD;  Location:  ENDOSCOPY    Colonoscopy,biopsy  06/10/2011    LUIS ALFREDO Horton. scarring. hemorrhoids. repeat 2013.     Colonoscpy, flexible, proximal to splenic flexure; w/directed submucosa injection(s), any substance  06/10/2011    Performed by NATHALIE HORTON at [facility]    Exc skin benig 1.1-2cm trunk,arm,leg  01/26/2011    Performed by LESLY SOLER at [facility]    Gastric bypass,obesity,sb reconstruc      Other      Gastric bypass    Other surgical history  2006 Dr Soler    Port A Cath Placement    Other surgical history  2/15/14 ([facility]) Dr.Matt Mack    PORT REMOVAL    Upper gi endoscopy,biopsy  06/10/2011    LUIS ALFREDO Horton normal EGD       Social History:  reports that she has never smoked. She has never used smokeless tobacco. She reports that she does not currently use alcohol. She reports that she does not currently use drugs after having used the following drugs: Cannabis.    Family History:   Family History   Problem Relation Age of Onset    Obesity Sister     Other (Other) Sister         hypothyroidism    Heart Disorder Mother         MI at 72    Obesity Mother     Other (Other) Mother     Other (Other) Paternal Grandfather         possible Crohn's    Heart Disorder Sister         arrhythmia    Cancer Father         prostate cx       Allergies:   Allergies   Allergen Reactions    Ibuprofen NAUSEA AND VOMITING     GI UPSET    Levaquin [Levofloxacin] PAIN    Penicillins UNKNOWN     CHILDHOOD ALLERGY, REACTION UNKNOWN       Medications:    No current facility-administered medications on file prior to encounter.     Current Outpatient Medications on File Prior to Encounter   Medication Sig Dispense Refill    CHOLECALCIFEROL OR Take 1 capsule by mouth daily.      losartan 100 MG Oral Tab Take 1 tablet (100 mg total) by mouth daily.      amLODIPine 2.5 MG Oral Tab Take 1 tablet (2.5 mg total) by mouth daily.      FOLIC  ACID OR Take 1 tablet by mouth daily.      Magnesium 500 MG Oral Tab Take 1 tablet (500 mg total) by mouth daily.      APPLE CIDER VINEGAR OR Take 1 tablet by mouth daily.      Vitamin A, [id number], (VITAMIN A OR) Take 1 tablet by mouth daily.      B Complex Vitamins (VITAMIN B COMPLEX OR) Take 1 tablet by mouth daily.      PARoxetine 30 MG Oral Tab Take 1 tablet (30 mg total) by mouth every morning. 90 tablet 1    hydrOXYzine 50 MG Oral Tab Take 50mg (1 tablet) twice daily as needed for anxiety and an additional 1 tablet (50 mg) nightly prn 270 tablet 1    naltrexone 50 MG Oral Tab Take 1 tablet (50 mg total) by mouth daily. 30 tablet 2    multivitamin Oral Tab Take 1 tablet by mouth daily.  0    inFLIXimab 100 MG Intravenous Recon Soln 5 mg/kg Every 6 weeks by IV due to Chron's 5 vial 3    ondansetron 4 MG Oral Tablet Dispersible Take 1 tablet (4 mg total) by mouth every 8 (eight) hours as needed for Nausea. 30 tablet 5       Review of Systems:   A comprehensive 14 point review of systems was completed.    Pertinent positives and negatives noted in the HPI.    Physical Exam:    /75 (BP Location: Left arm)   Pulse 77   Temp 98 °F (36.7 °C) (Oral)   Resp 16   Ht 5' 7\" (1.702 m)   Wt 266 lb (120.7 kg)   SpO2 93%   BMI 41.66 kg/m²       General: No acute distress. Comfortable, nontoxic   HEENT: Normocephalic atraumatic. Moist mucous membranes; Sclera anicteric.  Neck: Supple, no JVD  Respiratory: Clear to auscultation bilaterally. Reg resp rate & effort, no wheezes/crackles   Cardiovascular: S1, S2. Regular rate and rhythm. No murmurs appreciable   Chest and Back: No tenderness or deformity.  Abdomen: Soft, nontender, nondistended.  Positive bowel sounds. No rebound, guarding or organomegaly.  Neurologic: No focal neurological deficits. CNII-XII grossly intact.  Musculoskeletal: Moves all extremities.  Extremities: No edema or cyanosis.  Warm and well-perfused, distal sensation intact to light  touch  Skin: Left knee dressing saturated with dark red blood; no surrounding ecchymosis or effusion  Psychiatric: Appropriate mood and affect.      Diagnostic Data:      Labs:  No results for input(s): \"WBC\", \"HGB\", \"MCV\", \"PLT\", \"BAND\", \"INR\" in the last 168 hours.    Invalid input(s): \"LYM#\", \"MONO#\", \"BASOS#\", \"EOSIN#\"    No results for input(s): \"GLU\", \"BUN\", \"CREATSERUM\", \"GFRAA\", \"GFRNAA\", \"CA\", \"ALB\", \"NA\", \"K\", \"CL\", \"CO2\", \"ALKPHO\", \"AST\", \"ALT\", \"BILT\", \"TP\" in the last 168 hours.    CrCl cannot be calculated (Patient's most recent lab result is older than the maximum 7 days allowed.).    No results for input(s): \"PTP\", \"INR\" in the last 168 hours.    No results for input(s): \"TROP\", \"CK\" in the last 168 hours.    Imaging: Imaging data reviewed in Epic.      ASSESSMENT / PLAN:     Leena Hernandez Is a a 53 year old female who presents following left total knee arthroplasty    Problem List / Diagnoses    Left knee OA s/p TKA 4/10  HTN    Recommendations    Left knee OA s/p TKA 4/10  -Pain controlled with current regimen, monitor  - DVT prophylaxisx wound care per orthopedic surgery  - Bowel regimen  - PT/OT  - Check labs POD #1    HTN  -Hold antihypertensives until POD #1, resume as BP allows    DVT Mechanical Prophylaxis:   SCDs, Early ambuation  DVT Pharmacologic Prophylaxis   Medication    [START ON 4/11/2024] rivaroxaban (Xarelto) tab 10 mg         DVT Pharmacologic prophylaxis: Aspirin 81 mg    Dispo: stable on floor, will continue to follow while admitted    Plan of care discussed with patient and/or family at bedside.     N Juan Phelps MD  [facility]   [phone]    '

## 2024-04-10 NOTE — OPERATIVE REPORT
DATE OF PROCEDURE:  4/10/2024  PREOPERATIVE DIAGNOSIS: Left knee osteoarthritis.   POSTOPERATIVE DIAGNOSIS: Left knee osteoarthritis.   PROCEDURE PERFORMED: Cemented left total knee arthroplasty.   SURGEON:  Keo Keen M.D.  FIRST ASSISTANT:  Zoltan Sim PA-C.   SECOND ASSISTANT:  Anshul Craven  ANESTHESIA: Spinal plus femoral nerve block.   INDICATIONS: The patient has severe knee osteoarthritis that has not responded to conservative treatment including antiinflammatories and injections.  The patient's pain has limited activities of daily living including ambulation and exercise.    DESCRIPTION OF PROCEDURE: The patient was brought to the operating room and under spinal anesthetic, the left lower extremity was sterilely prepped and draped.  Preoperative antibiotics had been administered.   A high thigh tourniquet was inflated to 300.  It was medically necessary for the presence of the assistants listed for safe patient care.  This included positioning of the leg, holding of retractors to protect the underlying neurovascular structures and soft tissues.  It was required for the assistants to hold retractors to protect soft tissues while the primary surgeon made the bone cuts on the femur, the tibia, and the patella.  The assistants also held the leg stable and positioned while the primary surgeon made the approach, and the bone cuts, and affixed the guides and later the components to the bones.  An anterior incision was made, medial and lateral flaps were created. A medial parapatellar arthrotomy was created. The patella was everted, the knee was flexed.  Severe arthritic changes with areas of eburnated bone were noted.  A drill was placed in the distal femur and a 6-degree 10 mm cut was made.   The [employer] rotating platform system was used.  Sizing was performed and a size 7 cutting block was selected to make anterior, posterior, chamfer and box cuts for a cruciate-sacrificing knee. Attention was turned to the  tibia. An external alignment guide was utilized to take 10 mm off the less involved lateral side. Sizing was performed and a size 6 fit well. There were equal medial and lateral gaps when checked with a spacer.  Equal flexion and extension gaps were obtained. The stem cut was made for the tibia and 10 mm was taken off the posterior patella. Sizing was performed and a size 38 patella was selected. Three drill holes were placed.   Trial was performed with a 7 femur, 6 tibia, 10 mm insert and 38 mm patella. This gave good varus and valgus stability, good flexion and extension, good tracking of the patella. Drill holes were made in the distal femoral condyles in the trial template. Trial components were removed. Bony surfaces were irrigated and dried. Final components were precoated with cement which had been mixed under vacuum conditions. The bony surfaces were precoated as well. Components were impacted into place and excess cement removed. The knee was held in extension while the cement hardened.   The tourniquet was let down, bleeders were cauterized.  Lavage was performed. The arthrotomy was closed with a barbed running suture. Subcutaneous tissue was closed after further irrigation. This was closed with inverted 2-0 Vicryl for the subcutaneous tissue and staples for the skin. An aquacell dressing plus gauze covering was applied.  A lightly compressive dressing from toe to groin was applied. Estimated blood loss was 150 mL. There were no complications. There were no specimens.   The patient was brought to the PACU in stable condition.

## 2024-04-10 NOTE — ANESTHESIA PREPROCEDURE EVALUATION
PRE-OP EVALUATION    Patient Name: Leena Hernandez    Admit Diagnosis: PRIMARY OSTEOARTHRITIS OF LEFT KNEE    Pre-op Diagnosis: PRIMARY OSTEOARTHRITIS OF LEFT KNEE    LEFT TOTAL KNEE ARTHROPLASTY    Anesthesia Procedure: LEFT TOTAL KNEE ARTHROPLASTY (Left: Knee)    Surgeons and Role:     * Keo Keen MD - Primary    Pre-op vitals reviewed.  Temp: 97.5 °F (36.4 °C)  Pulse: 78  Resp: 16  BP: 145/97  SpO2: 95 %  Body mass index is 41.66 kg/m².    Current medications reviewed.  Hospital Medications:   acetaminophen (Tylenol Extra Strength) tab 1,000 mg  1,000 mg Oral Once    scopolamine (Transderm-Scop) 1 MG/3DAYS patch 1 patch  1 patch Transdermal Once    lactated ringers infusion   Intravenous Continuous    tranexamic acid in sodium chloride 0.7% (Cyklokapron) 1000 mg/100mL infusion premix 1,000 mg  1,000 mg Intravenous Once    ceFAZolin (Ancef) 2 g in 20mL IV syringe premix  2 g Intravenous Once       Outpatient Medications:     Medications Prior to Admission   Medication Sig Dispense Refill Last Dose    CHOLECALCIFEROL OR Take 1 capsule by mouth daily.   4/1/2024    losartan 100 MG Oral Tab Take 1 tablet (100 mg total) by mouth daily.   4/9/2024 at 0800    amLODIPine 2.5 MG Oral Tab Take 1 tablet (2.5 mg total) by mouth daily.   4/9/2024 at 0800    FOLIC ACID OR Take 1 tablet by mouth daily.   4/1/2024    Magnesium 500 MG Oral Tab Take 1 tablet (500 mg total) by mouth daily.   4/1/2024    APPLE CIDER VINEGAR OR Take 1 tablet by mouth daily.   4/1/2024    Vitamin A, 7627161868, (VITAMIN A OR) Take 1 tablet by mouth daily.   4/1/2024    B Complex Vitamins (VITAMIN B COMPLEX OR) Take 1 tablet by mouth daily.   4/1/2024    PARoxetine 30 MG Oral Tab Take 1 tablet (30 mg total) by mouth every morning. 90 tablet 1 4/9/2024 at 0800    hydrOXYzine 50 MG Oral Tab Take 50mg (1 tablet) twice daily as needed for anxiety and an additional 1 tablet (50 mg) nightly prn 270 tablet 1 4/9/2024    naltrexone 50 MG  Oral Tab Take 1 tablet (50 mg total) by mouth daily. 30 tablet 2 2024 at 0800    multivitamin Oral Tab Take 1 tablet by mouth daily.  0 2024    inFLIXimab 100 MG Intravenous Recon Soln 5 mg/kg Every 6 weeks by IV due to Chron's 5 vial 3 More than a month    ondansetron 4 MG Oral Tablet Dispersible Take 1 tablet (4 mg total) by mouth every 8 (eight) hours as needed for Nausea. 30 tablet 5 More than a month       Allergies: Ibuprofen, Levaquin [levofloxacin], and Penicillins      Anesthesia Evaluation        Anesthetic Complications           GI/Hepatic/Renal      (+) GERD   (+) hiatal hernia               (+) Crohn's disease         Cardiovascular      ECG reviewed.  Exercise tolerance: good     MET: >4    (+) obesity  (+) hypertension                                     Endo/Other    Negative endo/other ROS.                              Pulmonary      (+) asthma              (+) sleep apnea and noncompliant      Neuro/Psych      (+) depression  (+) anxiety                      Suffered seizure 23 secondary to alcohol withdrawal; has not had alcohol for 60 days. Hospitalized 24 for EtOH withdrawal.    Severe SAWYER per pulmonology consult. Pulmonology recommendations patient be observed postoperatively on stepdown with pulse oximetry.    Per PCP note, \"EKG shows NSR, T wave inversions in inferolateral leads that are unchanged since 2017 - abnormal stress test 2017 led to angiogram 10/2017 that showed NO clinical significant disease.\"    10/2017 Cath results: LHC, Coronary Angio, LV Pressures Only     CONCLUSIONS:    1. Normal coronary arteries.           Past Surgical History:   Procedure Laterality Date      2007    twins    Cholecystectomy  2007    Dr Torre    Colonoscopy      done for Crohn's flare    Colonoscopy  3/13/14 - LUIS ALFREDO Horton    adenoma, chronic inactive colitis, hemorrhoids, repeat 2016 w/MAC    Colonoscopy N/A 2017    Procedure: COLONOSCOPY;  Surgeon: Sai  MD Johny;  Location:  ENDOSCOPY    Colonoscopy,biopsy  06/10/2011    LUIS ALFREDO Horton. scarring. hemorrhoids. repeat 2013.     Colonoscpy, flexible, proximal to splenic flexure; w/directed submucosa injection(s), any substance  06/10/2011    Performed by NATHALIE HORTON at [facility]    Exc skin benig 1.1-2cm trunk,arm,leg  01/26/2011    Performed by LESLY SOLER at [facility], [facility]    Gastric bypass,obesity,sb reconstruc      Other      Gastric bypass    Other surgical history  2006 Dr Soler    Port A Cath Placement    Other surgical history  2/15/14 ([facility]) Dr.Matt Mack    PORT REMOVAL    Upper gi endoscopy,biopsy  06/10/2011    LUIS ALFREDO Horton normal EGD     Social History     Socioeconomic History    Marital status:    Tobacco Use    Smoking status: Never    Smokeless tobacco: Never   Vaping Use    Vaping status: Never Used   Substance and Sexual Activity    Alcohol use: Not Currently     Comment: 2 bottles of wine daily plus fifth of vodka or 5-8 white claw    Drug use: Not Currently     Types: Cannabis     Comment: hx of occassional edibles      History   Drug Use Unknown     Comment: hx of occassional edibles      Available pre-op labs reviewed.               Airway      Mallampati: III  Mouth opening: >3 FB  TM distance: > 6 cm  Neck ROM: full Cardiovascular    Cardiovascular exam normal.         Dental             Pulmonary    Pulmonary exam normal.                 Other findings  Dentition grossly normal.            ASA: 3   Plan: spinal  NPO status verified and patient meets guidelines.    Post-procedure pain management plan discussed with surgeon and patient.  Surgeon requests: regional block  Comment: Adductor canal blockade for postop analgesia; r/b/a d/w patient.  Plan/risks discussed with: patient                Present on Admission:  **None**

## 2024-04-11 VITALS
OXYGEN SATURATION: 96 % | SYSTOLIC BLOOD PRESSURE: 116 MMHG | RESPIRATION RATE: 17 BRPM | BODY MASS INDEX: 41.75 KG/M2 | HEIGHT: 67 IN | WEIGHT: 266 LBS | HEART RATE: 84 BPM | DIASTOLIC BLOOD PRESSURE: 66 MMHG | TEMPERATURE: 98 F

## 2024-04-11 LAB
HCT VFR BLD AUTO: 33.7 %
HGB BLD-MCNC: 11.4 G/DL

## 2024-04-11 PROCEDURE — 97535 SELF CARE MNGMENT TRAINING: CPT

## 2024-04-11 PROCEDURE — 85014 HEMATOCRIT: CPT | Performed by: ORTHOPAEDIC SURGERY

## 2024-04-11 PROCEDURE — 97165 OT EVAL LOW COMPLEX 30 MIN: CPT

## 2024-04-11 PROCEDURE — 85018 HEMOGLOBIN: CPT | Performed by: ORTHOPAEDIC SURGERY

## 2024-04-11 PROCEDURE — 97530 THERAPEUTIC ACTIVITIES: CPT

## 2024-04-11 PROCEDURE — 97116 GAIT TRAINING THERAPY: CPT

## 2024-04-11 NOTE — DISCHARGE INSTRUCTIONS
Continue to HOLD amlodipine, losartan until blood pressures rise above 130/80    Sometimes managing your health at home requires assistance.  The [facility] team has recognized your preference to use [facility].  They can be reached by phone at [phone].  The fax number for your reference is [phone].  A representative from the home health agency will contact you or your family to schedule your first visit.      Knee Replacement Discharge Instructions    Activity    Bathing  No tub baths, pools, or saunas until cleared by surgeon (about 4-6 weeks because it takes that long for the incision on the skin to heal and be a barrier to prevent infection).  When allowed to shower:  Gauze dressings are NOT waterproof and REQUIRE being covered with a waterproof barrier to keep the dressing and the incision dry.  SARAN WRAP, GLAD WRAP, and PRESS N SEAL WORK  REALLY WELL BUT ANY PLASTIC WRAP WILL DO.   Do not wash incision.   Remove entire wrapping and old dressing (if Gauze) after showering. Pat dry if necessary WITH A CLEAN TOWEL and cover incision with dry sterile gauze and paper tape. For other types of dressings, follow surgeon’s orders.                           GAUZE          Driving  Do not drive until cleared by surgeon. This is usually in four to six weeks after surgery. Discuss at follow-up office visit.   Not allowed while taking narcotic pain medication or muscle relaxants.    Sex  Usually allowed after four to six weeks - check with surgeon at your office visit.    Return to work  Usually allowed after four to six weeks. Discuss specific work activities with your surgeon.    Restrictions  For knee replacement surgery, follow instructions provided by physical therapy.  Do NOT put a pillow under your knee as it may be more difficult to straighten afterwards.    No smoking  Avoid smoking. It is known to cause breathing problems and can decrease the rate of healing.    Incision  care/Dressing changes  Wash hands before and after dressing changes.  FOR DRY GAUZE DRESSING:  Change dressing daily using dry sterile gauze and paper tape. Continue this until you follow up in the office with your surgeon. Have knee bent when changing dressing for more ease of bending afterwards.  There could be a small amount of redness around the staples or incision; this is normal.  Watch for increased redness, warmth, any odor, increased drainage or opening of the incision. A little clear yellow or blood tinged drainage is normal up to 2 weeks after surgery but it should be less every day until it stops.  Call physician if you notice any concerning changes.  Sutures/staples will be removed at first office visit (10 days- 3 weeks).                       GAUZE                                               Medication  Anticoagulants = blood thinners (Xarelto, Eliquis, Lovenox, Coumadin, or Aspirin)  Pill or shot form depending on what your physician orders.   IF placed on Coumadin, you may also need lab work done for monitoring.  You will bleed easier and bruise easier while on these medications.     Usually you will be on a blood thinner for about 2-5 weeks depending what physician orders.  Contact your physician if you have signs of bruising, nose bleeds or blood in your urine. You may consider using electric razors and soft bristle toothbrushes.  Do not take aspirin while taking blood thinners unless ordered by your physician.  Review anticoagulant education information sheet provided.    Discomfort  Surgical discomfort is normal for one to two months.  Have realistic goals and keep a positive outlook.  You may need pain medication regularly (every 4-6 hours) the first 2 weeks and then begin to decrease how often you are taking it.  Take pain medication as prescribed with food, especially before therapy, allowing 30-60 minutes to take effect.  Do not drink alcohol while on pain medication.  Keep pain  manageable; pain should not disrupt your sleep or activities like getting out of bed or walking.  As you have less discomfort, decrease the amount of pain medication you take. Use plain Tylenol (acetaminophen) for less severe pain.  Some pain medications have Tylenol (acetaminophen) in them such as Norco and Percocet. Do NOT take Tylenol (acetaminophen) within 4 hours of a dose of these medications.  Apply ice or cold therapy to surgical site for 20 minutes at least four times a day, especially after therapy. Be sure there is a thin cloth barrier between skin and ice or cold therapy.  Change position at least every 45 minutes while awake to avoid stiffness or increased discomfort.  For knee replacements- may elevate your leg by placing a pillow under entire leg. Do not place pillow only under the knee.  Have realistic goals and keep a positive outlook.  Deep breathing and relaxation techniques and distractions can help!  If you focus on something else, you do not experience the pain the same. Take advantage of everything available to your to help control you discomfort.  Contact physician if discomfort does not respond to pain medication.    Body changes  Constipation is common with the use of narcotics.  Eat fiber rich foods and drink plenty of fluids.  Use stool softeners such as Colace or Senakot while on narcotics, and laxatives such as Miralax or Milk of Magnesia if needed.   An enema or suppository may be needed if above measures do not work.    Prevention of infection and promotion of healing  Good hand washing is important. Everyone should wash their hands or use hand  as soon as they walk in your house-whether they live there or are visiting.  Keep bed linen/clothing freshly laundered.  Do not allow others or pets to touch your incision.  Avoid people that have colds or the flu.  Your surgeon may recommend that you take antibiotics before you undergo any dental or other invasive surgical procedures  after your joint replacement. Speak with your physician about this at your post-op office visit.  Eat a balanced diet high in fiber and drink plenty of fluids.   Continue using incentive spirometry because narcotics make you sleepy so you may not take good deep breaths. We do not want you to get pneumonia.     Post op Office visits  Schedule 10 days to 3 weeks after surgery WITH SURGEON’s office.  Additional visits may need to be scheduled. Your physician will discuss this at first post-op office visit.  Schedule outpatient physical therapy per your surgeon’s orders.  Schedule one week follow up after surgery WITH PRIMARY CARE PHYSICIAN; review your medications over last 6 months. Your body gets stressed by surgery and that stress can affect all your other health issues (such as high blood pressure, diabetes, CHF, afib, and asthma just to name a few).  We don’t want those other health issues to cause you to get readmitted to the hospital; much better for you to catch developing problems and prevent them from becoming larger ones.  FADI HOSE - IF ordered by your surgeon, wear these during the day and off at night.      Notify your surgeon if you notice any  of the following signs  Separation of incision line.  Increased redness, swelling, or warmth of skin around incision.  Increased or foul smelling drainage from incision  Red streaks on skin near incision.  Temperature >101 F.  Increased pain at incision not relieved by pain medication.      Signs of blood clot  Pain, excessive tenderness, redness, or swelling in leg or calf (other than incision site).  (CALL SURGEON)    Go directly to the ER or CALL 911 if  you:  become short of breath  have chest pain  cough up blood  have unexplained anxiety with breathing  Traveling and Handicapped parking  Check with you surgeon when allowed so you don’t set yourself up for greater chance of complications.  If traveling by car, get out to stretch every 2 hours.  This helps  prevent stiffness.  You may need to do this any time you travel for the first year after surgery.  If traveling by plane, BEFORE you get into a security line, let them know that you had your hip replaced, as you will most likely set off the metal detector. The doctors no longer provide an identification card for this as they are easily copied. ALSO request a wheelchair the first year to board and get off a plane…this aids in priority seating and you should sit on the aisle or at the bulkhead where you can easily stretch your legs and get up to walk up and down the aisles…this helps prevent blood clots and stiffness.  TEMPORARY HANDICAP PARKING APPLICATION  (good for 3-6 months)  - At Surgeon or PCP visit, request they fill out the form, then go to [facility] (only time you do not wait in a long line there). Some [facility] offices provide the same service. ([place] and [place] have this service; if you live in another [facility], you may check with them as well). You need space to open car doors to position yourself properly with walker to get in and out of your car safely; some parking spaces are  practically on top of each other and do not give you enough room.    Spanda- knee replacement (single layer compression sleeve):  All patients should wear the compression sleeves (SPANDA-) until first follow up visit to surgeon’s office ( about 2-3 weeks) and then check with surgeon if need to continue use.  Take off to shower. Best to keep on as much as possible, even at night.  Wash with mild soap and water; DRIP dry overnight.    Directions to put on and off:  IT TAKES 2 PIECES OF SPANDA- (compression sleeves), (USUALLY DIFFERENT SIZES because a calf is usually smaller than a knee.)   Use the longer tube (spanda-/compression sleeve) pulled up over the calf.   This 1st piece (spanda-/compression sleeve) should be on the calf part of the leg, from just below the knee to the ball of the foot to  expose the toes.   The 2nd piece (shorter compression sleeve) is pulled over and past the first sleeve onto the knee. The lower edge of the knee portion should overlap the top of the calf spanda- by a few inches. This is the only place where the 2 different pieces overlap.  The top edge of the second spanda- should be about a few inches above the knee but it should NOT be rolling down. The spanda- should be flat so that it does not roll and become too tight.  Makes sure it is NOT bunched up anywhere.   IF the spanda- feels TOO tight, then REMOVE it and call your surgeon to let them know.

## 2024-04-11 NOTE — CM/SW NOTE
04/11/24 0800   CM/SW Referral Data   Referral Source Social Work (self-referral)   Reason for Referral Discharge planning   Informant EMR;Clinical Staff Member   Discharge Needs   Anticipated D/C needs Home health care       HOME SITUATION per PT eval  Type of Home: House   Home Layout: One level  Stairs to Enter : 4  Railing: Yes  Stairs to Bedroom: 0     Lives With: Spouse  Patient Owned Equipment: Rolling walker;Cane     Prior Level of [place] per PT eval: Pt is typically independent with ADLs and ambulates with no AD. Pt with supportive spouse that can assist as needed.        Patient is a 52 y/o woman admitted s/p TKA.  Pt with pre-operative plan for [facility].  PT recommending HH at DC.  Referral sent to [facility] via [facility] and confirmation received that pt can be accepted.  Met with pt/spouse [facility]  Pt agreeable with DC plan.  No further DC needs/concerns identified at this time.  SW available should additional discharge needs arise.    Sherie Vu, [facility]  Discharge Planner  [phone]

## 2024-04-11 NOTE — OCCUPATIONAL THERAPY NOTE
OCCUPATIONAL THERAPY EVALUATION - INPATIENT    Room Number: 360/360-A  Evaluation Date: 4/11/2024     Type of Evaluation: Initial  Presenting Problem: s/p L TKA 4/10/24    Physician Order: IP Consult to Occupational Therapy  Reason for Therapy:  ADL/IADL Dysfunction and Discharge Planning      OCCUPATIONAL THERAPY ASSESSMENT   Patient is a 53 year old female admitted on 4/10/2024 with Presenting Problem: s/p L TKA 4/10/24. Co-Morbidities : anxiety, asthma, depression, Crohn's disease, seizure disorder, OA, HTN  Patient is currently functioning near baseline with toileting and lower body dressing.  Prior to admission, patient's baseline is independent.  Patient met all OT goals at supervision to Mod I level, reports will have initial supervision from  at discharge.  Patient reports no further questions/concerns at this time.     No further skilled       WEIGHT BEARING RESTRICTION  Weight Bearing Restriction: L lower extremity           L Lower Extremity: Weight Bearing as Tolerated    Recommendations for nursing staff:   Transfers: 1-person  Toileting location: Toilet    EVALUATION SESSION:  Patient at start of session: chair  FUNCTIONAL TRANSFER ASSESSMENT  Sit to Stand: Edge of Bed; Chair  Edge of Bed: Supervision  Chair: Supervision  Toilet Transfer: Supervision (standard toilet, heavy use of grab bar, reports safety frame at home)    BED MOBILITY  Supine to Sit : Modified Independent  Sit to Supine (OT): Modified Independent    BALANCE ASSESSMENT     FUNCTIONAL ADL ASSESSMENT  Eating: Modified Independent  Grooming Standing: Modified Independent  UB Dressing Seated: Modified Independent  LB Dressing Seated: Supervision (sufficient reach for underwear and pants management to knees without AE; very slightly decreased reach for sock management,  at bedside reports will assist initially if needed)  LB Dressing Standing: Supervision  Toileting Seated: Supervision  Toileting Standing:  Supervision      ACTIVITY TOLERANCE: mild SOB with activity, reports baseline                         O2 SATURATIONS       COGNITION  Overall Cognitive Status:  WFL - within functional limits  COGNITION ASSESSMENTS       Upper Extremity:   ROM: within functional limits   Strength: is within functional limits     EDUCATION PROVIDED  Patient: Role of Occupational Therapy; Functional Transfer Techniques; Fall Prevention; Weight Bear Status; Compensatory ADL Techniques  Patient's Response to Education: Verbalized Understanding ( also present)    Equipment used: RW  Demonstrates functional use    Therapist comments: Patient motivated and participatory. Educated on safety precautions and incorporation into ADLs and transfers, followed with good return demo. Performed all ADLs and functional transfers SBA to Mod I, aware of recommendation for initial supervision during shower transfers/showering, plan to purchase or borrow transfer bench, handout provided. Patient reports will have initial supervision from  at discharge.  Patient reports no further questions or concerns regarding discharge home to ADLs.     Patient End of Session: Up in chair;Needs met;Call light within reach;RN aware of session/findings;All patient questions and concerns addressed;Family present    OCCUPATIONAL PROFILE    HOME SITUATION  Type of Home: House  Home Layout: One level  Lives With: Spouse    Toilet and Equipment: Standard height toilet (safety frame)  Shower/Tub and Equipment: Tub-shower combo;Shower chair  Other Equipment: None    Occupation/Status: , works from home     Drives: Yes  Patient Regularly Uses: Glasses    Prior Level of Function: Patient reports independent in all I/ADL and functional mobility without device prior to admission.    SUBJECTIVE  \"It hurts a little more with certain movements\"    PAIN ASSESSMENT  Ratin  Location: L knee  Management Techniques: Activity promotion;Body mechanics;Breathing  techniques;Relaxation;Repositioning;Ice    OBJECTIVE  Precautions: Seizure  Fall Risk: High fall risk    WEIGHT BEARING RESTRICTION  Weight Bearing Restriction: L lower extremity           L Lower Extremity: Weight Bearing as Tolerated    AM-PAC ‘6-Clicks’ Inpatient Daily Activity Short Form  -   Putting on and taking off regular lower body clothing?: A Little (supervision)  -   Bathing (including washing, rinsing, drying)?: A Little (supervision)  -   Toileting, which includes using toilet, bedpan or urinal? : A Little (supervision)  -   Putting on and taking off regular upper body clothing?: None  -   Taking care of personal grooming such as brushing teeth?: None  -   Eating meals?: None    AM-PAC Score:  Score: 21  Approx Degree of Impairment: 32.79%  Standardized Score (AM-PAC Scale): 44.27      ADDITIONAL TESTS     NEUROLOGICAL FINDINGS        PLAN   Patient has been evaluated and presents with no skilled Occupational Therapy needs at this time.  Patient discharged from Occupational Therapy services.  Please re-order if a new functional limitation presents during this admission.      Patient Evaluation Complexity Level:   Occupational Profile/Medical History LOW - Brief history including review of medical or therapy records    Specific performance deficits impacting engagement in ADL/IADL LOW  1 - 3 performance deficits    Client Assessment/Performance Deficits LOW - No comorbidities nor modifications of tasks    Clinical Decision Making LOW - Analysis of occupational profile, problem-focused assessments, limited treatment options    Overall Complexity LOW     OT Session Time: 25 minutes  Self-Care Home Management: 10 minutes

## 2024-04-11 NOTE — PHYSICAL THERAPY NOTE
PHYSICAL THERAPY TREATMENT NOTE - INPATIENT    Room Number: 360/360-A     Session: 1       Number of Visits to Meet Established Goals: 2    Presenting Problem: s/p L TKA on 4/10/24  Co-Morbidities : anxiety, asthma, depression, Crohn's disease, seizure disorder, OA, HTN    ASSESSMENT   Patient demonstrates good  progress this session, goals  remain in progress.    Patient continues to function near baseline with transfers, gait, and stair negotiation.  Contributing factors to remaining limitations include decreased functional strength and pain.  Next session anticipate patient to progress transfers, gait, and stair negotiation.  Physical Therapy will continue to follow patient for duration of hospitalization.    Patient continues to benefit from continued skilled PT services: at discharge to promote functional independence in home.  Anticipate patient will return home with home health PT.    PLAN  PT Treatment Plan: Bed mobility;Endurance;Energy conservation;Patient education;Family education;Gait training;Strengthening;Stoop training;Stair training;Transfer training;Balance training;Range of motion  Rehab Potential : Good  Frequency (Obs): Daily    CURRENT GOALS     Goal #1  Patient is able to demonstrate supine - sit EOB @ level: supervision      Goal #2  Patient is able to demonstrate transfers Sit to/from Stand at assistance level: supervision         Goal #3     Patient is able to ambulate 150 feet with assistive device at assistance level: supervision   Goal #4     Patient will negotiate 4 stairs/one curb w/ assistive device and supervision   Goal #5     Patient verbalizes and/or demonstrates all precautions and safety concerns independently    Goal #6        Goal Comments: Goals established on 4/10/2024  4/11/2024 all goals ongoing    SUBJECTIVE  Pt reports she is feeling good    OBJECTIVE  Precautions: Seizure    WEIGHT BEARING RESTRICTION  Weight Bearing Restriction: L lower extremity           L Lower  Extremity: Weight Bearing as Tolerated    PAIN ASSESSMENT   Rating: Unable to rate  Location: L knee  Management Techniques: Activity promotion;Body mechanics;Repositioning    BALANCE                                                                                                                       Static Sitting: Good  Dynamic Sitting: Good           Static Standing: Fair  Dynamic Standing: Fair -    ACTIVITY TOLERANCE                         O2 WALK         AM-PAC '6-Clicks' INPATIENT SHORT FORM - BASIC MOBILITY  How much difficulty does the patient currently have...  Patient Difficulty: Turning over in bed (including adjusting bedclothes, sheets and blankets)?: A Little   Patient Difficulty: Sitting down on and standing up from a chair with arms (e.g., wheelchair, bedside commode, etc.): A Little   Patient Difficulty: Moving from lying on back to sitting on the side of the bed?: A Little   How much help from another person does the patient currently need...   Help from Another: Moving to and from a bed to a chair (including a wheelchair)?: A Little   Help from Another: Need to walk in hospital room?: A Little   Help from Another: Climbing 3-5 steps with a railing?: A Little       AM-PAC Score:  Raw Score: 18   Approx Degree of Impairment: 46.58%   Standardized Score (AM-PAC Scale): 43.63   CMS Modifier (G-Code): CK    FUNCTIONAL ABILITY STATUS  Gait Assessment   Functional Mobility/Gait Assessment  Gait Assistance: Contact guard assist  Distance (ft): 100,150  Assistive Device: Rolling walker  Pattern: L Decreased stance time  Stairs: Stairs;Car transfer  How Many Stairs: 4x1  Device: 1 Rail;Cane  Assist: Contact guard assist  Pattern: Ascend and Descend  Ascend and Descend : Step to  Car transfer: contact guard assist    Skilled Therapy Provided  Pt presents at BS chair  Pt educated on physical activity to increase and maintain strength and mobility  PTA d/c'd knee immobilizer due to pt demo sufficient quad  strength -able to perform SLR, QS, HS, weight shift in standing and march in place   Pt cued on performing reciprocal gait pattern for increase in ROM and proper gait mechanics  Pt educated on stair training for safety and proper mechanics  Pt educated on car t/f for safety and sequencing      Bed Mobility:  Rolling: NT   Supine<>Sit: NT   Sit<>Supine: NT     Transfer Mobility:  Sit<>Stand: CGA   Stand<>Sit: CGA   Gait: CGA    Therapist's Comments: RN aware of findings from session. Pt's  present at end of session. Both pt and  aware of safety precautions       THERAPEUTIC EXERCISES  Lower Extremity HS, SLR, marching, QS,AP     Upper Extremity      Position      Repetitions   5-10   Sets   1     Patient End of Session: Up in chair;Needs met;Call light within reach;RN aware of session/findings;Alarm set;Family present    PT Session Time: 30 minutes  Gait Training: 15 minutes  Therapeutic Activity: 15 minutes  Therapeutic Exercise:  minutes   Neuromuscular Re-education:  minutes    Partha Brito SPTA  Co signed Earnestine Montero PTA

## 2024-04-11 NOTE — PLAN OF CARE
Aox4, reporting tingling to LLE that has improved greatly since sx, reporting pain but trying to avoid use of opiods, gel ice packs placed, aquacel with noted serosag drainage, monitoring drainage closely, on room air , SAWYER with CPAP, scds in place, Spandigrip to be placed, starting on Xarelto in am, voiding via bathroom, up min with RW and KI WBAT, call light within reach, no further needs.        PATIENT NEEDS MEDS TO BEDS AT NE FOR HOME  PRE-OP WITH [facility]

## 2024-04-11 NOTE — PROGRESS NOTES
AVS reviewed, IV dc'd, dsg changed & Ace wrap placed , will dc home w/ [facility], spouse at bedside, both verbalized understanding of discharge instructions.

## 2024-04-11 NOTE — PLAN OF CARE
Alert and oriented x 4. Vitals stable on room air. Educated on use of incentive spirometry 10 times/hour while awake. Reports mild pain to left knee. Gauze/ace dressing intact to left knee, will change today, plan for daily gauze dressing changes at home per Zoltan Sim. Voiding without difficulty. Last BM 4/10/24. Tolerating regular diet. Plan of care discussed with patient and spouse.

## 2024-04-11 NOTE — PROGRESS NOTES
[facility]    Leena Hernandez Patient Status:  Outpatient in a Bed    1970 MRN [id number]   Location [facility] 3SW-A Attending Keo Keen MD   Hosp Day # 0 PCP Regine Posada MD     Subjective:  S/P LEFT Total Knee Arthroplasty  Systemic or Specific Complaints:No Complaints. Pain controlled      Objective:  Vital signs in last 24 hours:  Temp:  [97.4 °F (36.3 °C)-98 °F (36.7 °C)] 98 °F (36.7 °C)  Pulse:  [] 73  Resp:  [11-27] 18  BP: (110-145)/(58-97) 110/60  SpO2:  [92 %-99 %] 95 %    General: alert, appears stated age and cooperative   Wound: Gauze dressing intact with foam tape   DVT Exam: Right Calf :  Nontender  Left Calf:  Nontender   Neurovascular Intact sensation and pulses     Data Review  CBC:   Lab Results   Component Value Date    WBC 3.64 (L) 2022    RBC 4.12 2022    HGB 11.4 (L) 2024    HCT 33.7 (L) 2024     2022       Assessment/Plan:  Status Post left Total Knee Arthroplasty.  Continue P.T.  Increase activity as tolerated.   Anticipate discharge today  F/U in 2-3 weeks  Xarelto for DVT PPX

## 2024-04-11 NOTE — PROGRESS NOTES
[facility]   part of [facility]    Progress Note     Leena Hernandez Patient Status:  Outpatient in a Bed    1970 MRN [id number]   Location [facility] 3SW-A Attending Keo Keen MD   Hosp Day # 0 PCP Regine Posada MD     Follow up for: The primary encounter diagnosis was Pre-op testing. A diagnosis of Primary osteoarthritis of left knee was also pertinent to this visit.      Interval History/Subjective:     Wound oozing overnight resolved following dressing changes   JACQUE overnight  Afebrile, HDS    No new or worsening symptoms, pain controlled, looking forward to going home    Vital signs:  Temp:  [97.4 °F (36.3 °C)-98 °F (36.7 °C)] 98 °F (36.7 °C)  Pulse:  [] 84  Resp:  [11-27] 17  BP: (110-142)/(58-85) 116/66  SpO2:  [92 %-99 %] 96 %    Physical Exam:    General: NAD, Comfortable, Nontoxic   Respiratory: CTAB; reg resp rate & effort, no wheezes/crackles  Cardiovascular: S1, S2. Regular rate and rhythm. No murmurs appreciated  Abdomen: Soft, NTND, no guarding/rebound   Neurologic: No focal neurological deficits.   Extremities: No edema.   Skin: Dry, no rashes, ulcers or lesions; L knee wrapped in ACE, c/d/I     Diagnostic Data:      Labs:  Recent Labs   Lab 24  0449   HGB 11.4*       No results for input(s): \"GLU\", \"BUN\", \"CREATSERUM\", \"GFRAA\", \"GFRNAA\", \"CA\", \"ALB\", \"NA\", \"K\", \"CL\", \"CO2\", \"ALKPHO\", \"AST\", \"ALT\", \"BILT\", \"TP\" in the last 168 hours.    No results for input(s): \"PTP\", \"INR\" in the last 168 hours.    No results for input(s): \"TROP\", \"CK\" in the last 168 hours.         Imaging: Imaging data reviewed in Epic.    Medications:    iron sucrose  200 mg Intravenous Once    sennosides  17.2 mg Oral Nightly    docusate sodium  100 mg Oral BID    ketorolac  30 mg Intravenous Q6H    rivaroxaban  10 mg Oral Daily with food    folic acid  1 mg Oral Daily    naltrexone  50 mg Oral Daily    PARoxetine  30 mg Oral QAM       ASSESSMENT / PLAN:     Leena  Polly Hernandez Is a a 53 year old female who presents following left total knee arthroplasty     Problem List / Diagnoses     Left knee OA s/p TKA 4/10  HTN     Recommendations     Left knee OA s/p TKA 4/10  ABLA -- mild   -Pain controlled with current regimen, monitor  - DVT prophylaxisx wound care per orthopedic surgery  - Bowel regimen  - PT/OT -> home   - Postop Hgb 11.4 (from 12.7). Will give 200mg iv venofer x1      HTN  -BP currently well controlled, continue holding amlodpine/losartan until BP rises > 130/80  --Patient has BP Cuff at home and is comfortable checking BP daily, reviewed instructions & she is in agreement     DVT Mechanical Prophylaxis:   SCDs, Early ambuation  DVT Pharmacologic Prophylaxis   Medication    rivaroxaban (Xarelto) tab 10 mg              Dispo: no medical contraindications to discharge     Plan of care discussed with patient and/or family at bedside.    ALLISON Phelps MD  [place]   [phone]      This note was created using voice recognition technology. It may include inadvertent transcriptional errors. Any such errors should be contextually interpreted and should not be taken to alter the content or the meaning.     Note to Patient: The 21st Century Cures Act makes medical notes like these available to patients in the interest of transparency. However, be advised this is a medical document. It is intended as peer to peer communication. It is written in medical language and may contain abbreviations or verbiage that are unfamiliar. It may appear blunt or direct. Medical documents are intended to carry relevant information, facts as evident, and the clinical opinion of the practitioner and not intended to be the primary source of your information.  Please refer directly to myself or clinical staff for information regarding plan of care.

## (undated) DEVICE — SIGMA LCS HIGH PERFORMANCE STERILE THREADED HEADED PINS: Brand: SIGMA LCS HIGH PERFORMANCE

## (undated) DEVICE — 1200CC GUARDIAN II: Brand: GUARDIAN

## (undated) DEVICE — STOCKINETTE,IMPERVIOUS,12X48,STERILE: Brand: MEDLINE

## (undated) DEVICE — GLOVE SUR 8 SENSICARE PI PIP GRN PWD F

## (undated) DEVICE — 2T11 #2 PDO 36 X 36: Brand: 2T11 #2 PDO 36 X 36

## (undated) DEVICE — Device: Brand: STABLECUT®

## (undated) DEVICE — DISPOSABLE TOURNIQUET CUFF SINGLE BLADDER, DUAL PORT AND QUICK CONNECT CONNECTOR: Brand: COLOR CUFF

## (undated) DEVICE — SOLUTION IRRIG 3000ML 0.9% NACL FLX CONT

## (undated) DEVICE — UNIVERSAL STERIBUMP® STERILE (5/CASE): Brand: UNIVERSAL STERIBUMP®

## (undated) DEVICE — SIGMA LCS HIGH PERFORMANCE INSTRUMENTS STERILE THREADED PINS: Brand: SIGMA LCS HIGH PERFORMANCE

## (undated) DEVICE — ENDOSCOPY PACK UPPER: Brand: MEDLINE INDUSTRIES, INC.

## (undated) DEVICE — SLEEVE COMPR MD KNEE LEN SGL USE KENDALL SCD

## (undated) DEVICE — COVER LT HNDL RIG FOR SUR CAM DISP

## (undated) DEVICE — HOOD, PEEL-AWAY: Brand: FLYTE

## (undated) DEVICE — 3M™ RED DOT™ MONITORING ELECTRODE WITH FOAM TAPE AND STICKY GEL, 50/BAG, 20/CASE, 72/PLT 2570: Brand: RED DOT™

## (undated) DEVICE — 450 ML BOTTLE OF 0.05% CHLORHEXIDINE GLUCONATE IN 99.95% STERILE WATER FOR IRRIGATION, USP AND APPLICATOR.: Brand: IRRISEPT ANTIMICROBIAL WOUND LAVAGE

## (undated) DEVICE — GLOVE SUR 8 SENSICARE PI PIP CRM PWD F

## (undated) DEVICE — ANTIBACTERIAL UNDYED BRAIDED (POLYGLACTIN 910), SYNTHETIC ABSORBABLE SUTURE: Brand: COATED VICRYL

## (undated) DEVICE — PENCIL ES BTTN SWCH W/ TIP HOLSTER E-Z CLN

## (undated) DEVICE — GLOVE SUR 7.5 SENSICARE PI PIP CRM PWD F

## (undated) DEVICE — FILTERLINE NASAL ADULT O2/CO2

## (undated) DEVICE — NEEDLE SPNL 18GA L3.5IN PNK QNCKE STYL DISP

## (undated) DEVICE — GOWN SUR 2XL LEV 4 BLU W/ WHT V NK BND AERO

## (undated) DEVICE — BNDG,ELSTC,MATRIX,STRL,4"X5YD,LF,HOOK&LP: Brand: MEDLINE

## (undated) DEVICE — GLOVE SUR 8.5 SENSICARE PI PIP CRM PWD F

## (undated) DEVICE — WRAP COMPR UNIV KNEE HOT CLD GEL MICWV AND

## (undated) DEVICE — SYRINGE MED 30ML STD CLR PLAS LL TIP N CTRL

## (undated) DEVICE — FORCEP RADIAL JAW 4

## (undated) DEVICE — ENDOSCOPY PACK - LOWER: Brand: MEDLINE INDUSTRIES, INC.

## (undated) DEVICE — BOWL BNE CEM MIX DISP QUIK-VAC

## (undated) DEVICE — Device

## (undated) DEVICE — TOTAL KNEE CDS: Brand: MEDLINE INDUSTRIES, INC.

## (undated) DEVICE — BIPOLAR SEALER 23-112-1 AQM 6.0: Brand: AQUAMANTYS™

## (undated) DEVICE — HOOD: Brand: FLYTE

## (undated) DEVICE — Device: Brand: DEFENDO AIR/WATER/SUCTION AND BIOPSY VALVE

## (undated) DEVICE — DRAPE,U/SHT,SPLIT,FILM,60X84,STERILE: Brand: MEDLINE

## (undated) NOTE — IP AVS SNAPSHOT
[facility] [place] [address] [place], [address] ~ [phone]                Discharge Summary   6/14/2017    Kira Chavez           Admission Information        Provider Department    6/14/2017 Gabriela Romero MD  Endoscopy [    ]    [    ]    [    ]    [    ]       Omeprazole 40 MG Cpdr        Take 1 capsule (40 mg total) by mouth daily.  Take about 30min prior to evening meal.    Doe Gardner     [    ]    [    ]    [    ]    [    ]       Ondansetron HCl 8 MG tablet   C normal. Gargling with warm salt water (1/2 tsp salt to 1 glass warm water) or using throat lozenges will help.   - If you experience any sharp pain in your neck, abdomen or chest, vomiting of blood, oral temperature over 100 degrees Fahrenheit, light-headed 0.15 (06/09/17)  6.25      Metabolic Lab Results  (Last result in the past 90 days)    HgbA1C Glucose BUN Creatinine Calcium Alkaline Phosph AST    -- (06/09/17)  94 (06/09/17)  13 (06/09/17)  0.79 (06/09/17)  9.5 (06/09/17)  81 (06/09/17)  20      Metabol Summaries. If you've been to the Emergency Department or your doctor's office, you can view your past visit information in [employer] by going to Visits < Visit Summaries. [employer] questions? Call [phone] for help.   [employer] is NOT to be used for urge

## (undated) NOTE — LETTER
6/16/2017          [address]  [address] [address]    Dear Kingston Martinez,     Here are the biopsy/pathology findings from your recent EGD (upper endoscopy) and colonoscopy:     The biopsy/pathology findings from your upper endoscop

## (undated) NOTE — LETTER
[facility] Testing Department  Phone: [phone]  OUTSIDE TESTING RESULT REQUEST      TO:   Dr. Cristo Huerta Date: 5/24/17    FAX #: [phone]     IMPORTANT: FOR YOUR IMMEDIATE ATTENTION  Please FAX all test results listed below to

## (undated) NOTE — LETTER
OUTSIDE TESTING RESULT REQUEST     IMPORTANT: FOR YOUR IMMEDIATE ATTENTION  Please FAX all test results listed below to: [phone]     Testing already done on or about: 3/12/24     * * * * If testing is NOT complete, arrange with patient A.S.A.P. * * * *    Patient Name: Leena Hernandez  Surgery Date: 4/10/2024  Medical Record: [id number]  CSN: [id number]  : 1970 - A: 53 y     Sex: female  Surgeon(s):  Keo Keen MD  Procedure: LEFT TOTAL KNEE ARTHROPLASTY  Anesthesia Type: General     Surgeon: Keo Keen MD     The following Testing and Time Line are REQUIRED PER ANESTHESIA     EKG READ AND SIGNED WITHIN   90 days      Thank You,   Sent by: VADIM Bolton